# Patient Record
Sex: FEMALE | Race: ASIAN | NOT HISPANIC OR LATINO
[De-identification: names, ages, dates, MRNs, and addresses within clinical notes are randomized per-mention and may not be internally consistent; named-entity substitution may affect disease eponyms.]

---

## 2020-02-07 ENCOUNTER — APPOINTMENT (OUTPATIENT)
Dept: UROLOGY | Facility: CLINIC | Age: 80
End: 2020-02-07
Payer: MEDICAID

## 2020-02-07 VITALS
DIASTOLIC BLOOD PRESSURE: 71 MMHG | HEART RATE: 76 BPM | HEIGHT: 60 IN | WEIGHT: 115 LBS | BODY MASS INDEX: 22.58 KG/M2 | RESPIRATION RATE: 18 BRPM | TEMPERATURE: 97.8 F | SYSTOLIC BLOOD PRESSURE: 138 MMHG | OXYGEN SATURATION: 97 %

## 2020-02-07 DIAGNOSIS — Z78.9 OTHER SPECIFIED HEALTH STATUS: ICD-10-CM

## 2020-02-07 DIAGNOSIS — Z00.00 ENCOUNTER FOR GENERAL ADULT MEDICAL EXAMINATION W/OUT ABNORMAL FINDINGS: ICD-10-CM

## 2020-02-07 DIAGNOSIS — R35.0 FREQUENCY OF MICTURITION: ICD-10-CM

## 2020-02-07 PROCEDURE — 99204 OFFICE O/P NEW MOD 45 MIN: CPT

## 2020-02-07 NOTE — LETTER BODY
[FreeTextEntry1] : Jonatan Donahue MD\par [address]\[place] [address]\par [phone]\par \par Dear Dr. Donahue,\par \par Reason for Visit: UTI\par \par This is a 79 year-old woman with UTI. Patient is referred for evaluation of her condition. Her urine culture was positive for E.coli infection. She denies history of UTI. She denies any dysuria.  She is not sexually active. Currently, patient denies any gross hematuria or dysuria or urinary incontinence. The patient denies any aggravating or relieving factors. The patient denies any interference of function. The patient is entirely asymptomatic. All other review of systems are negative. She has no cancer in her family medical history. She has no previous surgical history. Past medical history, family history and social history were inquired and were noncontributory to current condition. The patient does not use tobacco or drink alcohol. Medications and allergies were reviewed. She has no known allergies to medication. \par \par On examination, the patient is a healthy-appearing woman in no acute distress. She is alert and oriented follows commands. She  has normal mood and affect. She is normocephalic. Oral no thrush. Neck is supple. Respirations are unlabored. Abdomen is soft and nontender. Liver is nonpalpable. Bladder is nonpalpable. No CVA tenderness. Neurologically she is grossly intact. No peripheral edema. Skin without gross abnormality.\par \par Assessment: UTI\par \par I counseled the patient. I discussed the various etiologies of her symptoms. I recommend she take trimethoprim for 3 months to prevent recurrent infection. I discussed the potential side effects of the medication. I counseled the patient on its use and side effects. If the patient develops any side effects, the patient will discontinue the medication and contact me. Patient reports that she is asymptomatic. I recommend she repeat urinalysis, chlamydia/GC amplfiication, and urine culture to further evaluate. Risks and alternatives were discussed. I answered the patient questions. The patient will follow-up as directed and will contact me with any questions or concerns.Thank you for the opportunity to participate in the care of this patient. I'll keep you updated on her progress.\par \par Plan: Trimethoprim for 3 months. Urinalysis. Culture. Chlamydia/GC amplification. Follow up as directed.

## 2020-02-07 NOTE — ADDENDUM
[FreeTextEntry1] : Entered by Allan Rodriguez, acting as scribe for Dr. Josué Zarate.\par \par The documentation recorded by the scribe accurately reflects the service I personally performed and the decisions made by me.

## 2020-02-07 NOTE — PHYSICAL EXAM
[General Appearance - Well Nourished] : well nourished [General Appearance - Well Developed] : well developed [Well Groomed] : well groomed [Normal Appearance] : normal appearance [General Appearance - In No Acute Distress] : no acute distress [Edema] : no peripheral edema [Respiration, Rhythm And Depth] : normal respiratory rhythm and effort [Exaggerated Use Of Accessory Muscles For Inspiration] : no accessory muscle use [Abdomen Soft] : soft [Abdomen Tenderness] : non-tender [Costovertebral Angle Tenderness] : no ~M costovertebral angle tenderness [Urinary Bladder Findings] : the bladder was normal on palpation [Normal Station and Gait] : the gait and station were normal for the patient's age [] : no rash [Oriented To Time, Place, And Person] : oriented to person, place, and time [Affect] : the affect was normal [No Focal Deficits] : no focal deficits [Mood] : the mood was normal [Not Anxious] : not anxious [No Palpable Adenopathy] : no palpable adenopathy

## 2020-02-09 LAB
APPEARANCE: CLEAR
BACTERIA UR CULT: NORMAL
BACTERIA: NEGATIVE
BILIRUBIN URINE: NEGATIVE
BLOOD URINE: NEGATIVE
C TRACH RRNA SPEC QL NAA+PROBE: NOT DETECTED
COLOR: YELLOW
GLUCOSE QUALITATIVE U: NEGATIVE
HYALINE CASTS: 2 /LPF
KETONES URINE: NEGATIVE
LEUKOCYTE ESTERASE URINE: NEGATIVE
MICROSCOPIC-UA: NORMAL
N GONORRHOEA RRNA SPEC QL NAA+PROBE: NOT DETECTED
NITRITE URINE: NEGATIVE
PH URINE: 6
PROTEIN URINE: NORMAL
RED BLOOD CELLS URINE: 2 /HPF
SOURCE AMPLIFICATION: NORMAL
SPECIFIC GRAVITY URINE: 1.03
SQUAMOUS EPITHELIAL CELLS: 1 /HPF
UROBILINOGEN URINE: NORMAL
WHITE BLOOD CELLS URINE: 1 /HPF

## 2020-03-10 ENCOUNTER — APPOINTMENT (OUTPATIENT)
Dept: UROLOGY | Facility: CLINIC | Age: 80
End: 2020-03-10

## 2020-06-09 ENCOUNTER — RX RENEWAL (OUTPATIENT)
Age: 80
End: 2020-06-09

## 2020-06-18 ENCOUNTER — APPOINTMENT (OUTPATIENT)
Dept: UROLOGY | Facility: CLINIC | Age: 80
End: 2020-06-18
Payer: MEDICAID

## 2020-06-18 PROCEDURE — 99214 OFFICE O/P EST MOD 30 MIN: CPT | Mod: 95

## 2020-06-18 RX ORDER — TRIMETHOPRIM 100 MG/1
100 TABLET ORAL
Qty: 30 | Refills: 3 | Status: ACTIVE | COMMUNITY
Start: 2020-02-07 | End: 1900-01-01

## 2020-06-18 NOTE — LETTER BODY
[FreeTextEntry1] : Jonatan Donahue MD\par [address]\[place] [address]\par [phone]\par \par Dear Dr. Donahue,\par \par Reason for Visit: UTI\par \par This is a 79 year-old woman with history of UTI. Her previous urine culture was positive for E.coli infection. Patient requested telephone visit. Verbal consent was obtained. Patient was at home and consulted over the phone through a TeleHealth visit. I was in the office in [place]. Since she was last seen, patient reports no issues. She denies any dysuria or other symptoms of UTI. She continues to take Trimethoprim regularly without side effects or difficulties.  All other review of systems are negative. Past medical history, family history and social history were unchanged. Medications and allergies were reviewed. She has no known allergies to medication. \par \par Given that this was a Telehealth visit, I was unable to physically examine the patient, his physical examination was deferred. \par \par Her urinalysis was unremarkable. Her culture and chlamydia/GC amplification were negative.\par \par Assessment: UTI, resolved.\par \par I counseled the patient. She is overall doing well. I asked that she continue her course of Trimethoprim as directed. I encouraged her to follow-up in 3 months to repeat urinalysis and culture to ensure stability. Risks and alternatives were discussed. I answered the patient questions. Patient understands that if she develops gross hematuria or any urinary discomfort, she will contact me for further evaluation. The patient will follow-up as directed and will contact me with any questions or concerns.Thank you for the opportunity to participate in the care of this patient. I'll keep you updated on her progress.\par \par Plan: Continue course of Trimethoprim. Follow up in 3 months.\par

## 2020-06-18 NOTE — HISTORY OF PRESENT ILLNESS
[Home] : at home, [unfilled] , at the time of the visit. [place] [place] : at the medical office located in  [Verbal consent obtained from patient] : the patient, [unfilled] [FreeTextEntry1] : PT's DAUGHTER will assist with video call. \par CONFIRMED phone#: [phone]\par \par The patient-doctor relationship has been established in a face to face fashion via real time video/audio HIPAA compliant communication using telemedicine software [employer].  The patient's identity has been confirmed.  The patient was previously emailed a copy of the telemedicine consent.  They have had a chance to review and has now given verbal consent and has requested care to be assessed and treated through telemedicine and understands there maybe limitations in this process and they may need further follow up care in the office and or hospital settings.   \par \par Please refer to URO Consult note

## 2023-01-11 ENCOUNTER — INPATIENT (INPATIENT)
Facility: HOSPITAL | Age: 83
LOS: 4 days | Discharge: HOME HEALTH | End: 2023-01-16
Attending: HOSPITALIST | Admitting: HOSPITALIST
Payer: MEDICARE

## 2023-01-11 VITALS
TEMPERATURE: 98 F | SYSTOLIC BLOOD PRESSURE: 135 MMHG | WEIGHT: 134.92 LBS | HEART RATE: 71 BPM | OXYGEN SATURATION: 97 % | RESPIRATION RATE: 18 BRPM | DIASTOLIC BLOOD PRESSURE: 61 MMHG

## 2023-01-11 DIAGNOSIS — Z90.10 ACQUIRED ABSENCE OF UNSPECIFIED BREAST AND NIPPLE: Chronic | ICD-10-CM

## 2023-01-11 LAB
ALBUMIN SERPL ELPH-MCNC: 3.3 G/DL — LOW (ref 3.5–5.2)
ALP SERPL-CCNC: 472 U/L — HIGH (ref 30–115)
ALT FLD-CCNC: 51 U/L — HIGH (ref 0–41)
AMMONIA BLD-MCNC: 27 UMOL/L — SIGNIFICANT CHANGE UP (ref 11–55)
ANION GAP SERPL CALC-SCNC: 11 MMOL/L — SIGNIFICANT CHANGE UP (ref 7–14)
APTT BLD: 32.7 SEC — SIGNIFICANT CHANGE UP (ref 27–39.2)
AST SERPL-CCNC: 100 U/L — HIGH (ref 0–41)
BASOPHILS # BLD AUTO: 0.1 K/UL — SIGNIFICANT CHANGE UP (ref 0–0.2)
BASOPHILS NFR BLD AUTO: 1.3 % — HIGH (ref 0–1)
BILIRUB SERPL-MCNC: 1.1 MG/DL — SIGNIFICANT CHANGE UP (ref 0.2–1.2)
BUN SERPL-MCNC: 42 MG/DL — HIGH (ref 10–20)
CALCIUM SERPL-MCNC: 8.7 MG/DL — SIGNIFICANT CHANGE UP (ref 8.4–10.5)
CHLORIDE SERPL-SCNC: 102 MMOL/L — SIGNIFICANT CHANGE UP (ref 98–110)
CO2 SERPL-SCNC: 23 MMOL/L — SIGNIFICANT CHANGE UP (ref 17–32)
CREAT SERPL-MCNC: 1 MG/DL — SIGNIFICANT CHANGE UP (ref 0.7–1.5)
EGFR: 56 ML/MIN/1.73M2 — LOW
EOSINOPHIL # BLD AUTO: 0.16 K/UL — SIGNIFICANT CHANGE UP (ref 0–0.7)
EOSINOPHIL NFR BLD AUTO: 2 % — SIGNIFICANT CHANGE UP (ref 0–8)
GLUCOSE SERPL-MCNC: 129 MG/DL — HIGH (ref 70–99)
HCT VFR BLD CALC: 28.4 % — LOW (ref 37–47)
HGB BLD-MCNC: 9.4 G/DL — LOW (ref 12–16)
IMM GRANULOCYTES NFR BLD AUTO: 1.1 % — HIGH (ref 0.1–0.3)
INR BLD: 1.07 RATIO — SIGNIFICANT CHANGE UP (ref 0.65–1.3)
LIDOCAIN IGE QN: 48 U/L — SIGNIFICANT CHANGE UP (ref 7–60)
LYMPHOCYTES # BLD AUTO: 1.28 K/UL — SIGNIFICANT CHANGE UP (ref 1.2–3.4)
LYMPHOCYTES # BLD AUTO: 16.1 % — LOW (ref 20.5–51.1)
MCHC RBC-ENTMCNC: 32.1 PG — HIGH (ref 27–31)
MCHC RBC-ENTMCNC: 33.1 G/DL — SIGNIFICANT CHANGE UP (ref 32–37)
MCV RBC AUTO: 96.9 FL — SIGNIFICANT CHANGE UP (ref 81–99)
MONOCYTES # BLD AUTO: 0.51 K/UL — SIGNIFICANT CHANGE UP (ref 0.1–0.6)
MONOCYTES NFR BLD AUTO: 6.4 % — SIGNIFICANT CHANGE UP (ref 1.7–9.3)
NEUTROPHILS # BLD AUTO: 5.8 K/UL — SIGNIFICANT CHANGE UP (ref 1.4–6.5)
NEUTROPHILS NFR BLD AUTO: 73.1 % — SIGNIFICANT CHANGE UP (ref 42.2–75.2)
NRBC # BLD: 0 /100 WBCS — SIGNIFICANT CHANGE UP (ref 0–0)
NT-PROBNP SERPL-SCNC: 428 PG/ML — HIGH (ref 0–300)
PLATELET # BLD AUTO: 233 K/UL — SIGNIFICANT CHANGE UP (ref 130–400)
POTASSIUM SERPL-MCNC: 4.5 MMOL/L — SIGNIFICANT CHANGE UP (ref 3.5–5)
POTASSIUM SERPL-SCNC: 4.5 MMOL/L — SIGNIFICANT CHANGE UP (ref 3.5–5)
PROT SERPL-MCNC: 5.4 G/DL — LOW (ref 6–8)
PROTHROM AB SERPL-ACNC: 12.2 SEC — SIGNIFICANT CHANGE UP (ref 9.95–12.87)
RBC # BLD: 2.93 M/UL — LOW (ref 4.2–5.4)
RBC # FLD: 15 % — HIGH (ref 11.5–14.5)
SARS-COV-2 RNA SPEC QL NAA+PROBE: SIGNIFICANT CHANGE UP
SODIUM SERPL-SCNC: 136 MMOL/L — SIGNIFICANT CHANGE UP (ref 135–146)
TROPONIN T SERPL-MCNC: <0.01 NG/ML — SIGNIFICANT CHANGE UP
WBC # BLD: 7.94 K/UL — SIGNIFICANT CHANGE UP (ref 4.8–10.8)
WBC # FLD AUTO: 7.94 K/UL — SIGNIFICANT CHANGE UP (ref 4.8–10.8)

## 2023-01-11 PROCEDURE — 74177 CT ABD & PELVIS W/CONTRAST: CPT | Mod: 26,MA

## 2023-01-11 PROCEDURE — 70450 CT HEAD/BRAIN W/O DYE: CPT | Mod: 26

## 2023-01-11 PROCEDURE — 99222 1ST HOSP IP/OBS MODERATE 55: CPT

## 2023-01-11 PROCEDURE — 99291 CRITICAL CARE FIRST HOUR: CPT

## 2023-01-11 PROCEDURE — 71275 CT ANGIOGRAPHY CHEST: CPT | Mod: 26

## 2023-01-11 RX ORDER — MEMANTINE HYDROCHLORIDE 10 MG/1
10 TABLET ORAL
Refills: 0 | Status: DISCONTINUED | OUTPATIENT
Start: 2023-01-11 | End: 2023-01-16

## 2023-01-11 RX ORDER — ATORVASTATIN CALCIUM 80 MG/1
10 TABLET, FILM COATED ORAL AT BEDTIME
Refills: 0 | Status: DISCONTINUED | OUTPATIENT
Start: 2023-01-11 | End: 2023-01-16

## 2023-01-11 RX ORDER — ENOXAPARIN SODIUM 100 MG/ML
70 INJECTION SUBCUTANEOUS EVERY 12 HOURS
Refills: 0 | Status: DISCONTINUED | OUTPATIENT
Start: 2023-01-11 | End: 2023-01-12

## 2023-01-11 RX ORDER — ONDANSETRON 8 MG/1
4 TABLET, FILM COATED ORAL EVERY 8 HOURS
Refills: 0 | Status: DISCONTINUED | OUTPATIENT
Start: 2023-01-11 | End: 2023-01-16

## 2023-01-11 RX ORDER — PANTOPRAZOLE SODIUM 20 MG/1
40 TABLET, DELAYED RELEASE ORAL
Refills: 0 | Status: DISCONTINUED | OUTPATIENT
Start: 2023-01-11 | End: 2023-01-16

## 2023-01-11 RX ORDER — OMEGA-3 ACID ETHYL ESTERS 1 G
2 CAPSULE ORAL
Refills: 0 | Status: DISCONTINUED | OUTPATIENT
Start: 2023-01-11 | End: 2023-01-16

## 2023-01-11 RX ORDER — MORPHINE SULFATE 50 MG/1
4 CAPSULE, EXTENDED RELEASE ORAL EVERY 4 HOURS
Refills: 0 | Status: DISCONTINUED | OUTPATIENT
Start: 2023-01-11 | End: 2023-01-16

## 2023-01-11 RX ORDER — MORPHINE SULFATE 50 MG/1
2 CAPSULE, EXTENDED RELEASE ORAL ONCE
Refills: 0 | Status: DISCONTINUED | OUTPATIENT
Start: 2023-01-11 | End: 2023-01-11

## 2023-01-11 RX ORDER — LANOLIN ALCOHOL/MO/W.PET/CERES
3 CREAM (GRAM) TOPICAL AT BEDTIME
Refills: 0 | Status: DISCONTINUED | OUTPATIENT
Start: 2023-01-11 | End: 2023-01-16

## 2023-01-11 RX ORDER — ACETAMINOPHEN 500 MG
650 TABLET ORAL EVERY 6 HOURS
Refills: 0 | Status: DISCONTINUED | OUTPATIENT
Start: 2023-01-11 | End: 2023-01-16

## 2023-01-11 RX ORDER — NALOXONE HYDROCHLORIDE 4 MG/.1ML
0.4 SPRAY NASAL ONCE
Refills: 0 | Status: DISCONTINUED | OUTPATIENT
Start: 2023-01-11 | End: 2023-01-16

## 2023-01-11 RX ORDER — LOSARTAN POTASSIUM 100 MG/1
50 TABLET, FILM COATED ORAL DAILY
Refills: 0 | Status: DISCONTINUED | OUTPATIENT
Start: 2023-01-11 | End: 2023-01-16

## 2023-01-11 RX ORDER — SENNA PLUS 8.6 MG/1
2 TABLET ORAL AT BEDTIME
Refills: 0 | Status: DISCONTINUED | OUTPATIENT
Start: 2023-01-11 | End: 2023-01-16

## 2023-01-11 RX ORDER — CHLORHEXIDINE GLUCONATE 213 G/1000ML
1 SOLUTION TOPICAL
Refills: 0 | Status: DISCONTINUED | OUTPATIENT
Start: 2023-01-11 | End: 2023-01-16

## 2023-01-11 RX ORDER — FUROSEMIDE 40 MG
20 TABLET ORAL EVERY 12 HOURS
Refills: 0 | Status: DISCONTINUED | OUTPATIENT
Start: 2023-01-11 | End: 2023-01-16

## 2023-01-11 RX ORDER — POLYETHYLENE GLYCOL 3350 17 G/17G
17 POWDER, FOR SOLUTION ORAL DAILY
Refills: 0 | Status: DISCONTINUED | OUTPATIENT
Start: 2023-01-11 | End: 2023-01-16

## 2023-01-11 RX ORDER — MORPHINE SULFATE 50 MG/1
2 CAPSULE, EXTENDED RELEASE ORAL EVERY 4 HOURS
Refills: 0 | Status: DISCONTINUED | OUTPATIENT
Start: 2023-01-11 | End: 2023-01-16

## 2023-01-11 RX ORDER — MECLIZINE HCL 12.5 MG
25 TABLET ORAL DAILY
Refills: 0 | Status: DISCONTINUED | OUTPATIENT
Start: 2023-01-11 | End: 2023-01-16

## 2023-01-11 RX ADMIN — MORPHINE SULFATE 2 MILLIGRAM(S): 50 CAPSULE, EXTENDED RELEASE ORAL at 17:37

## 2023-01-11 RX ADMIN — ATORVASTATIN CALCIUM 10 MILLIGRAM(S): 80 TABLET, FILM COATED ORAL at 22:13

## 2023-01-11 RX ADMIN — SENNA PLUS 2 TABLET(S): 8.6 TABLET ORAL at 22:14

## 2023-01-11 RX ADMIN — ENOXAPARIN SODIUM 70 MILLIGRAM(S): 100 INJECTION SUBCUTANEOUS at 22:13

## 2023-01-11 NOTE — ED PROVIDER NOTE - CARE PLAN
1 Principal Discharge DX:	Gallbladder neoplasm  Secondary Diagnosis:	Liver metastasis  Secondary Diagnosis:	Pulmonary embolism  Secondary Diagnosis:	Admission for palliative care

## 2023-01-11 NOTE — H&P ADULT - NSHPLABSRESULTS_GEN_ALL_CORE
9.4    7.94  )-----------( 233      ( 11 Jan 2023 15:36 )             28.4       01-11    136  |  102  |  42<H>  ----------------------------<  129<H>  4.5   |  23  |  1.0    Ca    8.7      11 Jan 2023 15:36    TPro  5.4<L>  /  Alb  3.3<L>  /  TBili  1.1  /  DBili  x   /  AST  100<H>  /  ALT  51<H>  /  AlkPhos  472<H>  01-11                      PT/INR - ( 11 Jan 2023 15:36 )   PT: 12.20 sec;   INR: 1.07 ratio         PTT - ( 11 Jan 2023 15:36 )  PTT:32.7 sec    Lactate Trend      CARDIAC MARKERS ( 11 Jan 2023 17:41 )  x     / <0.01 ng/mL / x     / x     / x          CT Abdomen and Pelvis w/ IV Cont (01.11.23 @ 17:07)   IMPRESSION: Gallbladder with diffuse asymmetric wall thickening up to 1.4 cm, consistent with given history of gallbladder carcinoma.  Diffuse hepatic metastases, retroperitoneal and periportal lymphadenopathy.  Small pelvic ascites.  Small left pleural effusion.  Acute pulmonary emboli in the right lower lobar pulmonary arteries, partially imaged.

## 2023-01-11 NOTE — ED PROVIDER NOTE - PHYSICAL EXAMINATION
CONST: chronically ill appearing.   EYES: , Sclera and conjunctiva clear.  NECK: Non-tender, no meningeal signs, supple  CARD: Normal S1 S2; Normal rate and rhythm  RESP: crackles at bases, no distress  GI: Soft, non-tender, non-distended.  MS: Normal ROM in all extremities. 4+ edema of lower extremities extending up to thighs. no calf pain, pedal pulses 1+ bilaterally  SKIN: Warm, dry, no acute rashes. Good turgor  NEURO: A&Ox3, No focal deficits. Strength 5/5 with no sensory deficits

## 2023-01-11 NOTE — H&P ADULT - ASSESSMENT
Patient is a 82 year old Female with past medical history of GB cancer with Liver mets, breast cancer s/p mastectomy, HTN, HLD, was brought into the ER by child stating her leg swelling is not improving with outpatient Lasix and uncontrollable abdominal pain. Son is requesting hospice / palliative care     # GB cancer with Liver mets   # Breast cancer s/p mastectomy   # Abdominal pain  - Hospice consult   - Palliative consult   - Analgesia PRN   - MOLST Form filled     # LE Edema with Failed outpatient Lasix   - PO Lasix 20 BID  -   - Daily weights     # Essential Hypertension    - Monitor Vital Signs    - DASH diet   - Continue with home medications     # Mixed Hyperlipidemia  - DASH diet   - Continue with home medications     # Miscellaneous   - Continue with home medications   - Monitor Vital Signs   - Activity Order (Ambulate with Assistance)   - Diet order   - DVT PPX: HOLD OFF UNTIL IMAGING IS DONE: IF SHE HAS A PE WILL REQUIRE A HEPARIN DRIP  - GI PPX (Protonix)  - CHG 4% Bath QD and PRN  - Discussed the above case and plan with the Attending      Patient is a 82 year old Female with past medical history of GB cancer with Liver mets, breast cancer s/p mastectomy, HTN, HLD, was brought into the ER by child stating her leg swelling is not improving with outpatient Lasix and uncontrollable abdominal pain. Son is requesting hospice / palliative care     # GB cancer with Liver mets   # Breast cancer s/p mastectomy   # Abdominal pain  # PE in right lung  - Hospice consult   - Palliative consult   - family seeking Oncology eval for second opinion (possible palliative treatment vs hospice care)  - Analgesia PRN   - MOLST Form filled   - if CT head shows no mets, start lovenox treatment dose    # LE Edema with Failed outpatient Lasix   - PO Lasix 20 BID  -   - Daily weights     # Essential Hypertension    - Monitor Vital Signs    - DASH diet   - Continue with home medications     # Mixed Hyperlipidemia  - DASH diet   - Continue with home medications     # Miscellaneous   - Continue with home medications   - Monitor Vital Signs   - Activity Order (Ambulate with Assistance)   - Diet order   - DVT PPX: pending CT head, likely needs AC  - GI PPX (Protonix)  - CHG 4% Bath QD and PRN  - Discussed the above case and plan with the Attending      Patient is a 82 year old Female with past medical history of GB cancer with Liver mets, breast cancer s/p mastectomy, HTN, HLD, was brought into the ER by child stating her leg swelling is not improving with outpatient Lasix and uncontrollable abdominal pain.     # GB cancer with Liver mets   # Breast cancer s/p mastectomy   # Abdominal pain  # PE in right lung  - Hospice consult   - Palliative consult   - family seeking Oncology eval for second opinion (possible palliative treatment vs hospice care)  - Analgesia PRN   - MOLST Form filled   - if CT head shows no mets, start lovenox treatment dose    # LE Edema with Failed outpatient Lasix   - PO Lasix 20 BID  -   - Daily weights     # Essential Hypertension    - Monitor Vital Signs    - DASH diet   - Continue with home medications     # Mixed Hyperlipidemia  - DASH diet   - Continue with home medications     # Miscellaneous   - Continue with home medications   - Monitor Vital Signs   - Activity Order (Ambulate with Assistance)   - Diet order   - DVT PPX: pending CT head, likely needs AC  - GI PPX (Protonix)  - CHG 4% Bath QD and PRN  - Discussed the above case and plan with the Attending

## 2023-01-11 NOTE — ED PROVIDER NOTE - OBJECTIVE STATEMENT
82-year-old female with history of gallbladder cancer and liver presented to the emergency department for evaluation of lower extremity edema and abdominal pain lower extremity edema present times months, worse despite furosemide treatment outpatient.  Also and has uncontrolled abdominal pain.  Scheduled to go for endoscopy tomorrow for further evaluation.  CT on 1/5/2023 shows innumerable hepatic masses consistent assistant with hepatic metastasis most of which are necrotic.  Gallbladder is diffusely irregular soft tissue nodularity of gallbladder wall in the fundus concerning for gallbladder neoplasm.  Patient has diffuse periportal, aortocaval, retroperitoneal/left para-aortic lymph lymphadenopathy consistent with mets.  Daughter concerned given edema not responding to outpatient treatment and intractable abdominal pain. Patient has not undergone any treatment for the above findings. No known fevers or chills.  Denies any other complaints

## 2023-01-11 NOTE — ED PROVIDER NOTE - ATTENDING CONTRIBUTION TO CARE
I was present for and supervised the key and critical aspects of the procedures performed during the care of the patient. Patient is an 82-year-old female with history of gallbladder cancer as well as liver metastasis presents to the emergency department for worsening lower extremity edema as well as abdominal pain onset over the past several months worse since patient initiated diuretic treatment as an outpatient patient describes pain is constant gnawing in nature she denies any fevers chills notes nausea however no vomiting no diarrhea no skin changes patient's cysts primarily managed at a facility in [place] however has a paper report CT on 1 5 reveals multiple hepatic masses with necrosis confirming the above I reviewed inpatient records patient not undergoing chemotherapy or treatment for malignancy she denies any chest pain shortness of breath diaphoresis or other skin changes    On physical exam patient is ill-appearing normocephalic atraumatic pupils equal round reactive light accommodation extraocular muscle intact oropharynx clear chest reveals crackles b/l abdomen diffuse tenderness to palpation however no guarding no rebound bowel sounds positive extremities patient has 4+ pitting edema past the knees pedal pulses 2+ and equal skin no rashes neurologically patient has full strength full sensation able to move all 4 extremities    Assessment plan patient is brought in by her daughter for evaluation of worsening lower extremity edema however patient is known to have gallbladder mass with liver metastasis patient given IV fluids IV pain medicine per my independent evaluation EKG not consistent with STEMI given the patient's symptoms we obtained CT abdomen pelvis which confirms the above findings in addition patient had a PE found on the CT of the abdomen pelvis we obtained a dedicated CT of the chest which reveals PE given that this patient has metastasis we obtained CT of the head prior to starting anticoagulation that was negative for metastasis started on anticoagulation daughter was updated and agrees with plan of care we discussed the case with Dr. Anderson the admitting physician in preparation consulted gastroenterology given this nature of the malignancy patient's worsening edema and worsening weakness I will admit for further monitoring at this time

## 2023-01-11 NOTE — H&P ADULT - HISTORY OF PRESENT ILLNESS
Patient is a 82 year old Female with past medical history of GB cancer with Liver mets, breast cancer s/p mastectomy, HTN, HLD, was brought into the ER by child stating her leg swelling is not improving with outpatient Lasix and uncontrollable abdominal pain. Morphine has improved the pain. Patient was scheduled to have an EGD with EUS tomorrow (1/12/2023). Son denies any fevers, chills, nausea, vomiting, chest pain, SOB, diarrhea, constipation, jaundice.

## 2023-01-11 NOTE — ED PROVIDER NOTE - NS ED ROS FT
Constitutional: See HPI.  Eyes: No visual changes, eye pain or discharge.  ENMT: No hearing changes, pain, discharge or infections. No neck pain or stiffness. No limited ROM  Cardiac: + lower extremity edema. No chest pain with exertion.  Respiratory: No cough or respiratory distress.   GI: + abd pain.   : No dysuria, frequency or burning. No Discharge  MS: No myalgia, muscle weakness, joint pain or back pain.  Neuro: No headache or weakness.  Skin: No skin rash.  Except as documented in the HPI, all other systems are negative.

## 2023-01-12 DIAGNOSIS — C23 MALIGNANT NEOPLASM OF GALLBLADDER: ICD-10-CM

## 2023-01-12 DIAGNOSIS — Z51.5 ENCOUNTER FOR PALLIATIVE CARE: ICD-10-CM

## 2023-01-12 DIAGNOSIS — R10.9 UNSPECIFIED ABDOMINAL PAIN: ICD-10-CM

## 2023-01-12 DIAGNOSIS — Z71.89 OTHER SPECIFIED COUNSELING: ICD-10-CM

## 2023-01-12 LAB
ALBUMIN SERPL ELPH-MCNC: 2.8 G/DL — LOW (ref 3.5–5.2)
ALP SERPL-CCNC: 444 U/L — HIGH (ref 30–115)
ALT FLD-CCNC: 47 U/L — HIGH (ref 0–41)
ANION GAP SERPL CALC-SCNC: 13 MMOL/L — SIGNIFICANT CHANGE UP (ref 7–14)
AST SERPL-CCNC: 97 U/L — HIGH (ref 0–41)
BILIRUB SERPL-MCNC: 1.1 MG/DL — SIGNIFICANT CHANGE UP (ref 0.2–1.2)
BUN SERPL-MCNC: 36 MG/DL — HIGH (ref 10–20)
CALCIUM SERPL-MCNC: 8.5 MG/DL — SIGNIFICANT CHANGE UP (ref 8.4–10.5)
CHLORIDE SERPL-SCNC: 107 MMOL/L — SIGNIFICANT CHANGE UP (ref 98–110)
CO2 SERPL-SCNC: 21 MMOL/L — SIGNIFICANT CHANGE UP (ref 17–32)
CREAT SERPL-MCNC: 1 MG/DL — SIGNIFICANT CHANGE UP (ref 0.7–1.5)
EGFR: 56 ML/MIN/1.73M2 — LOW
GLUCOSE SERPL-MCNC: 99 MG/DL — SIGNIFICANT CHANGE UP (ref 70–99)
HCT VFR BLD CALC: 24.4 % — LOW (ref 37–47)
HGB BLD-MCNC: 8.1 G/DL — LOW (ref 12–16)
MCHC RBC-ENTMCNC: 32.3 PG — HIGH (ref 27–31)
MCHC RBC-ENTMCNC: 33.2 G/DL — SIGNIFICANT CHANGE UP (ref 32–37)
MCV RBC AUTO: 97.2 FL — SIGNIFICANT CHANGE UP (ref 81–99)
NRBC # BLD: 0 /100 WBCS — SIGNIFICANT CHANGE UP (ref 0–0)
PLATELET # BLD AUTO: 207 K/UL — SIGNIFICANT CHANGE UP (ref 130–400)
POTASSIUM SERPL-MCNC: 4.1 MMOL/L — SIGNIFICANT CHANGE UP (ref 3.5–5)
POTASSIUM SERPL-SCNC: 4.1 MMOL/L — SIGNIFICANT CHANGE UP (ref 3.5–5)
PROT SERPL-MCNC: 5 G/DL — LOW (ref 6–8)
RBC # BLD: 2.51 M/UL — LOW (ref 4.2–5.4)
RBC # FLD: 15 % — HIGH (ref 11.5–14.5)
SODIUM SERPL-SCNC: 141 MMOL/L — SIGNIFICANT CHANGE UP (ref 135–146)
WBC # BLD: 6.71 K/UL — SIGNIFICANT CHANGE UP (ref 4.8–10.8)
WBC # FLD AUTO: 6.71 K/UL — SIGNIFICANT CHANGE UP (ref 4.8–10.8)

## 2023-01-12 PROCEDURE — 99232 SBSQ HOSP IP/OBS MODERATE 35: CPT

## 2023-01-12 PROCEDURE — 99223 1ST HOSP IP/OBS HIGH 75: CPT

## 2023-01-12 PROCEDURE — 99222 1ST HOSP IP/OBS MODERATE 55: CPT

## 2023-01-12 PROCEDURE — 99221 1ST HOSP IP/OBS SF/LOW 40: CPT

## 2023-01-12 PROCEDURE — 93970 EXTREMITY STUDY: CPT | Mod: 26

## 2023-01-12 RX ORDER — INFLUENZA VIRUS VACCINE 15; 15; 15; 15 UG/.5ML; UG/.5ML; UG/.5ML; UG/.5ML
0.7 SUSPENSION INTRAMUSCULAR ONCE
Refills: 0 | Status: COMPLETED | OUTPATIENT
Start: 2023-01-12 | End: 2023-01-12

## 2023-01-12 RX ORDER — ENOXAPARIN SODIUM 100 MG/ML
70 INJECTION SUBCUTANEOUS EVERY 12 HOURS
Refills: 0 | Status: DISCONTINUED | OUTPATIENT
Start: 2023-01-12 | End: 2023-01-13

## 2023-01-12 RX ADMIN — ENOXAPARIN SODIUM 70 MILLIGRAM(S): 100 INJECTION SUBCUTANEOUS at 17:38

## 2023-01-12 RX ADMIN — Medication 20 MILLIGRAM(S): at 17:41

## 2023-01-12 RX ADMIN — MEMANTINE HYDROCHLORIDE 10 MILLIGRAM(S): 10 TABLET ORAL at 17:38

## 2023-01-12 RX ADMIN — ENOXAPARIN SODIUM 70 MILLIGRAM(S): 100 INJECTION SUBCUTANEOUS at 05:56

## 2023-01-12 RX ADMIN — Medication 2 GRAM(S): at 05:56

## 2023-01-12 RX ADMIN — ATORVASTATIN CALCIUM 10 MILLIGRAM(S): 80 TABLET, FILM COATED ORAL at 22:37

## 2023-01-12 RX ADMIN — PANTOPRAZOLE SODIUM 40 MILLIGRAM(S): 20 TABLET, DELAYED RELEASE ORAL at 05:56

## 2023-01-12 RX ADMIN — MEMANTINE HYDROCHLORIDE 10 MILLIGRAM(S): 10 TABLET ORAL at 05:55

## 2023-01-12 RX ADMIN — Medication 20 MILLIGRAM(S): at 05:55

## 2023-01-12 RX ADMIN — CHLORHEXIDINE GLUCONATE 1 APPLICATION(S): 213 SOLUTION TOPICAL at 06:05

## 2023-01-12 RX ADMIN — Medication 2 GRAM(S): at 17:47

## 2023-01-12 RX ADMIN — SENNA PLUS 2 TABLET(S): 8.6 TABLET ORAL at 22:38

## 2023-01-12 RX ADMIN — LOSARTAN POTASSIUM 50 MILLIGRAM(S): 100 TABLET, FILM COATED ORAL at 05:55

## 2023-01-12 NOTE — PHYSICAL THERAPY INITIAL EVALUATION ADULT - SPECIFY REASON(S)
Attempted to see the Pt this AM for Bedside PT , Pt refused despite max encouragement , Daughter bedside ,RN aware . will f/u as appropriate.

## 2023-01-12 NOTE — CONSULT NOTE ADULT - SUBJECTIVE AND OBJECTIVE BOX
Patient is a 82y old  Female who presents with a chief complaint of LE edema (12 Jan 2023 16:28)      HPI:  Patient is a 82 year old Female with past medical history of GB cancer with Liver mets, breast cancer s/p mastectomy, HTN, HLD, was brought into the ER by child stating her leg swelling is not improving with outpatient Lasix and uncontrollable abdominal pain. Morphine has improved the pain. Patient was scheduled to have an EGD with EUS tomorrow (1/12/2023). Son denies any fevers, chills, nausea, vomiting, chest pain, SOB, diarrhea, constipation, jaundice.  (11 Jan 2023 18:13)              ROS:  Negative except for:    PAST MEDICAL & SURGICAL HISTORY:  Gallbladder neoplasm      Liver metastases      Hypertension      S/P mastectomy          SOCIAL HISTORY:    FAMILY HISTORY:  No pertinent family history in first degree relatives        MEDICATIONS  (STANDING):  atorvastatin 10 milliGRAM(s) Oral at bedtime  chlorhexidine 4% Liquid 1 Application(s) Topical <User Schedule>  enoxaparin Injectable 70 milliGRAM(s) SubCutaneous every 12 hours  furosemide    Tablet 20 milliGRAM(s) Oral every 12 hours  influenza  Vaccine (HIGH DOSE) 0.7 milliLiter(s) IntraMuscular once  losartan 50 milliGRAM(s) Oral daily  memantine 10 milliGRAM(s) Oral two times a day  naloxone Injectable 0.4 milliGRAM(s) IV Push once  omega-3-Acid Ethyl Esters 2 Gram(s) Oral two times a day  pantoprazole    Tablet 40 milliGRAM(s) Oral before breakfast  polyethylene glycol 3350 17 Gram(s) Oral daily  senna 2 Tablet(s) Oral at bedtime    MEDICATIONS  (PRN):  acetaminophen     Tablet .. 650 milliGRAM(s) Oral every 6 hours PRN Temp greater or equal to 38C (100.4F), Mild Pain (1 - 3)  aluminum hydroxide/magnesium hydroxide/simethicone Suspension 30 milliLiter(s) Oral every 4 hours PRN Dyspepsia  bisacodyl 5 milliGRAM(s) Oral daily PRN Constipation  meclizine 25 milliGRAM(s) Oral daily PRN Dizziness  melatonin 3 milliGRAM(s) Oral at bedtime PRN Insomnia  morphine  - Injectable 2 milliGRAM(s) IV Push every 4 hours PRN Moderate Pain (4 - 6)  morphine  - Injectable 4 milliGRAM(s) IV Push every 4 hours PRN Severe Pain (7 - 10)  ondansetron Injectable 4 milliGRAM(s) IV Push every 8 hours PRN Nausea and/or Vomiting      Allergies    aspirin (Unknown)  penicillin (Unknown)    Intolerances        Vital Signs Last 24 Hrs  T(C): 36 (12 Jan 2023 14:05), Max: 36.7 (12 Jan 2023 05:10)  T(F): 96.8 (12 Jan 2023 14:05), Max: 98.1 (12 Jan 2023 05:10)  HR: 77 (12 Jan 2023 14:05) (77 - 84)  BP: 138/63 (12 Jan 2023 14:05) (138/63 - 142/60)  BP(mean): --  RR: 18 (12 Jan 2023 14:05) (18 - 18)  SpO2: --              PHYSICAL EXAM  General: adult in NAD  HEENT: clear oropharynx, anicteric sclera, pink conjunctiva  Neck: supple  CV: normal S1/S2 with no murmur rubs or gallops  Lungs: positive air movement b/l ant lungs,clear to auscultation, no wheezes, no rales  Abdomen: soft non-tender non-distended, no hepatosplenomegaly  Ext: no clubbing cyanosis or edema  Skin: no rashes and no petechiae  Neuro: alert and oriented X 4, no focal deficits      LABS:                          8.1    6.71  )-----------( 207      ( 12 Jan 2023 06:45 )             24.4         Mean Cell Volume : 97.2 fL  Mean Cell Hemoglobin : 32.3 pg  Mean Cell Hemoglobin Concentration : 33.2 g/dL  Auto Neutrophil # : x  Auto Lymphocyte # : x  Auto Monocyte # : x  Auto Eosinophil # : x  Auto Basophil # : x  Auto Neutrophil % : x  Auto Lymphocyte % : x  Auto Monocyte % : x  Auto Eosinophil % : x  Auto Basophil % : x      Serial CBC's  01-12 @ 06:45  Hct-24.4 / Hgb-8.1 / Plat-207 / RBC-2.51 / WBC-6.71  Serial CBC's  01-11 @ 15:36  Hct-28.4 / Hgb-9.4 / Plat-233 / RBC-2.93 / WBC-7.94      01-12    141  |  107  |  36<H>  ----------------------------<  99  4.1   |  21  |  1.0    Ca    8.5      12 Jan 2023 06:45    TPro  5.0<L>  /  Alb  2.8<L>  /  TBili  1.1  /  DBili  x   /  AST  97<H>  /  ALT  47<H>  /  AlkPhos  444<H>  01-12      PT/INR - ( 11 Jan 2023 15:36 )   PT: 12.20 sec;   INR: 1.07 ratio         PTT - ( 11 Jan 2023 15:36 )  PTT:32.7 sec          RADIOLOGY & ADDITIONAL STUDIES:    < from: CT Abdomen and Pelvis w/ IV Cont (01.11.23 @ 17:07) >  IMPRESSION: Gallbladder with diffuse asymmetric wall thickening up to 1.4   cm, consistent with given history of gallbladder carcinoma.    Diffuse hepatic metastases, retroperitoneal and periportal   lymphadenopathy.    Small pelvic ascites.    Small left pleural effusion.    Acute pulmonary emboli in the right lower lobar pulmonary arteries,   partially imaged.        Spoke with Dr. TAFOYA on 1/11/2023 5:39 PM with readback.    < end of copied text >      < from: CT Angio Chest PE Protocol w/ IV Cont (01.11.23 @ 18:12) >    IMPRESSION:    Pulmonary embolism in the right lower lobe pulmonary artery with   extension into multiple segmental and subsegmental branches.      Findings were discussed with Dr. Tafoya at 6:50 PM 1/11/2023, with read   back    < end of copied text >    < from: CT Head No Cont (01.11.23 @ 18:12) >  Impression:    No acute intracranial abnormality. No CT evidence of metastatic lesion.    --- End of Report ---    < end of copied text >     Patient is a 82y old  Female who presents with a chief complaint of LE edema (12 Jan 2023 16:28)      HPI:  Patient is a 82 year old Female with past medical history of GB cancer with Liver mets, breast cancer s/p mastectomy, HTN, HLD, was brought into the ER by child stating her leg swelling is not improving with outpatient Lasix and uncontrollable abdominal pain. Morphine has improved the pain. Patient was scheduled to have an EGD with EUS tomorrow (1/12/2023). Son denies any fevers, chills, nausea, vomiting, chest pain, SOB, diarrhea, constipation, jaundice.  (11 Jan 2023 18:13)      Oncology    We were asked to see the patient in regards to treatment options.  Her grandson  Hussein was at bedside who translated for me also I was on the phone with the patient's daughter and son-in-law during the visit.  They cannot provide me much about the second opinion but apparently she was recommended to be transition to hospice.    The patient has been relatively ambulatory aids and recently she has not been walking much in the last few days.  She has right upper quadrant and epigastric pain.  She lost weight.  She developed edema in the lower extremities.  She lives with her daughter as well as her  and has been relatively independent.        Prior to me examining her I saw her walk to the bathroom.  She did require help getting out of bed was little bit unsteady on her feet and was looking for things to hold onto.  Also had a somewhat hard time getting into bed some which was due to of her edema      ROS:  Negative except for: as above, edema, pain, weight loss, fatigue, rest of 12 point ROS was negative    PAST MEDICAL & SURGICAL HISTORY:  Gallbladder neoplasm      Liver metastases      Hypertension      S/P mastectomy          SOCIAL HISTORY: Denies alcohol, smoking or IV drugs use     FAMILY HISTORY:  Mom had throat cancer      MEDICATIONS  (STANDING):  atorvastatin 10 milliGRAM(s) Oral at bedtime  chlorhexidine 4% Liquid 1 Application(s) Topical <User Schedule>  enoxaparin Injectable 70 milliGRAM(s) SubCutaneous every 12 hours  furosemide    Tablet 20 milliGRAM(s) Oral every 12 hours  influenza  Vaccine (HIGH DOSE) 0.7 milliLiter(s) IntraMuscular once  losartan 50 milliGRAM(s) Oral daily  memantine 10 milliGRAM(s) Oral two times a day  naloxone Injectable 0.4 milliGRAM(s) IV Push once  omega-3-Acid Ethyl Esters 2 Gram(s) Oral two times a day  pantoprazole    Tablet 40 milliGRAM(s) Oral before breakfast  polyethylene glycol 3350 17 Gram(s) Oral daily  senna 2 Tablet(s) Oral at bedtime    MEDICATIONS  (PRN):  acetaminophen     Tablet .. 650 milliGRAM(s) Oral every 6 hours PRN Temp greater or equal to 38C (100.4F), Mild Pain (1 - 3)  aluminum hydroxide/magnesium hydroxide/simethicone Suspension 30 milliLiter(s) Oral every 4 hours PRN Dyspepsia  bisacodyl 5 milliGRAM(s) Oral daily PRN Constipation  meclizine 25 milliGRAM(s) Oral daily PRN Dizziness  melatonin 3 milliGRAM(s) Oral at bedtime PRN Insomnia  morphine  - Injectable 2 milliGRAM(s) IV Push every 4 hours PRN Moderate Pain (4 - 6)  morphine  - Injectable 4 milliGRAM(s) IV Push every 4 hours PRN Severe Pain (7 - 10)  ondansetron Injectable 4 milliGRAM(s) IV Push every 8 hours PRN Nausea and/or Vomiting      Allergies    aspirin (Unknown)  penicillin (Unknown)    Intolerances        Vital Signs Last 24 Hrs  T(C): 36 (12 Jan 2023 14:05), Max: 36.7 (12 Jan 2023 05:10)  T(F): 96.8 (12 Jan 2023 14:05), Max: 98.1 (12 Jan 2023 05:10)  HR: 77 (12 Jan 2023 14:05) (77 - 84)  BP: 138/63 (12 Jan 2023 14:05) (138/63 - 142/60)  BP(mean): --  RR: 18 (12 Jan 2023 14:05) (18 - 18)  SpO2: --              PHYSICAL EXAM  General: adult in NAD, looked tired  HEENT: clear oropharynx, anicteric sclera, pink conjunctiva  Neck: supple  CV: normal S1/S2 with no murmur rubs or gallops  Lungs: positive air movement b/l ant lungs, clear to auscultation, no wheezes, no rales  Abdomen: tenderness in RUQ on exam but soft  Ext: no clubbing cyanosis but has edema   Skin: no rashes and no petechiae  Neuro: alert and oriented X 4, no focal deficits      LABS:                          8.1    6.71  )-----------( 207      ( 12 Jan 2023 06:45 )             24.4         Mean Cell Volume : 97.2 fL  Mean Cell Hemoglobin : 32.3 pg  Mean Cell Hemoglobin Concentration : 33.2 g/dL  Auto Neutrophil # : x  Auto Lymphocyte # : x  Auto Monocyte # : x  Auto Eosinophil # : x  Auto Basophil # : x  Auto Neutrophil % : x  Auto Lymphocyte % : x  Auto Monocyte % : x  Auto Eosinophil % : x  Auto Basophil % : x      Serial CBC's  01-12 @ 06:45  Hct-24.4 / Hgb-8.1 / Plat-207 / RBC-2.51 / WBC-6.71  Serial CBC's  01-11 @ 15:36  Hct-28.4 / Hgb-9.4 / Plat-233 / RBC-2.93 / WBC-7.94      01-12    141  |  107  |  36<H>  ----------------------------<  99  4.1   |  21  |  1.0    Ca    8.5      12 Jan 2023 06:45    TPro  5.0<L>  /  Alb  2.8<L>  /  TBili  1.1  /  DBili  x   /  AST  97<H>  /  ALT  47<H>  /  AlkPhos  444<H>  01-12      PT/INR - ( 11 Jan 2023 15:36 )   PT: 12.20 sec;   INR: 1.07 ratio         PTT - ( 11 Jan 2023 15:36 )  PTT:32.7 sec          RADIOLOGY & ADDITIONAL STUDIES:    < from: CT Abdomen and Pelvis w/ IV Cont (01.11.23 @ 17:07) >  IMPRESSION: Gallbladder with diffuse asymmetric wall thickening up to 1.4   cm, consistent with given history of gallbladder carcinoma.    Diffuse hepatic metastases, retroperitoneal and periportal   lymphadenopathy.    Small pelvic ascites.    Small left pleural effusion.    Acute pulmonary emboli in the right lower lobar pulmonary arteries,   partially imaged.        Spoke with Dr. TAFOYA on 1/11/2023 5:39 PM with readback.    < end of copied text >      < from: CT Angio Chest PE Protocol w/ IV Cont (01.11.23 @ 18:12) >    IMPRESSION:    Pulmonary embolism in the right lower lobe pulmonary artery with   extension into multiple segmental and subsegmental branches.      Findings were discussed with Dr. Tafoya at 6:50 PM 1/11/2023, with read   back    < end of copied text >    < from: CT Head No Cont (01.11.23 @ 18:12) >  Impression:    No acute intracranial abnormality. No CT evidence of metastatic lesion.    --- End of Report ---    < end of copied text >

## 2023-01-12 NOTE — PATIENT PROFILE ADULT - FALL HARM RISK - UNIVERSAL INTERVENTIONS
Detail Level: Simple
Bed in lowest position, wheels locked, appropriate side rails in place/Call bell, personal items and telephone in reach/Instruct patient to call for assistance before getting out of bed or chair/Non-slip footwear when patient is out of bed/[place] to call system/Physically safe environment - no spills, clutter or unnecessary equipment/Purposeful Proactive Rounding/Room/bathroom lighting operational, light cord in reach

## 2023-01-12 NOTE — CONSULT NOTE ADULT - ASSESSMENT
82F with PMH of gallbladder cancer with liver mets, breast cancer s/p mastectomy, HTN, and HLD here with LE edema and abdominal pain. CT abdomen showed diffuse hepatic mets; patient was initially scheduled for EGD 1/12/23 as outpatient. Outpatient oncology reportedly recommended hospice. Family is considering second opinion from oncology for palliative disease-directed therapy, vs hospice care. Is on lovenox for PE. Is on morphine 2mg IV and 4mg IV PRN, used 2mg IV x 1/24 hours. Palliative care called for symptoms and GOC.

## 2023-01-12 NOTE — HOSPICE CARE NOTE - CONVESATION DETAILS
Attempts to reach patient's daughter and son in law unsuccessful. Unable to leave voice mail on daughter's line as mailbox was full. left message with hospice contact information on son in law's voice mail. Pending return call.  Patient's spouse also admitted to [facility] with hospice referral orders.

## 2023-01-12 NOTE — PATIENT PROFILE ADULT - FUNCTIONAL ASSESSMENT - DAILY ACTIVITY ASSESSMENT TYPE
Pt arrives c/o SOB at rest starting at midnight 1/2.  Pt states he felt a little better and went to bed and woke up and \"couldn't breathe.\"  Pt states he has history of pneumonia and is worried that is what is happening.  Pt denies cough, fever, chills, pain, dizziness.  Pt states he only takes his \"water pill when I need to because it makes me pee so much.\"  No other complaints at this time.   
Admission

## 2023-01-12 NOTE — CONSULT NOTE ADULT - PROBLEM SELECTOR RECOMMENDATION 9
Full consult to follow shortly. Please call [phone] with any acute concerns. - patient currently comfortable  - has tylenol PRN for pain, as well as morphine 4mg IV Q4 PRN, would continue for now (none used/24 hours)

## 2023-01-12 NOTE — CONSULT NOTE ADULT - SUBJECTIVE AND OBJECTIVE BOX
Chief complaint/Reason for consult: gallbladder neoplasm wit liver mets    HPI:  Patient is a 82 year old Female with past medical history of GB cancer with Liver mets, breast cancer s/p mastectomy, HTN, HLD, was brought into the ER by child stating her leg swelling is not improving with outpatient Lasix and uncontrollable abdominal pain. Morphine has improved the pain. Patient was scheduled to have an EGD with EUS tomorrow (1/12/2023). Son denies any fevers, chills, nausea, vomiting, chest pain, SOB, diarrhea, constipation, jaundice.  (11 Jan 2023 18:13)     ID#[id number]  GI Updates: 82yFemale [facility] GB cancer with liver mets, breast cancer s/p mastectomy, HTN admitted with anasarca and abdominal pain. Currently Patient denies nausea, vomiting, hematemesis, melena, blood in stool, diarrhea, constipation, abdominal pain.      PAST MEDICAL & SURGICAL HISTORY:   Gallbladder neoplasm      Liver metastases      Hypertension      S/P mastectomy            Family history:  FAMILY HISTORY:  No pertinent family history in first degree relatives      No GI cancers in first or second degree relatives    Social History: No smoking. No alcohol. No illegal drug use.    Allergies:   aspirin (Unknown)  penicillin (Unknown)      MEDICATIONS: Home Medications:  atorvastatin 10 mg oral tablet: 1 tab(s) orally once a day (at bedtime) (11 Jan 2023 18:21)  furosemide 20 mg oral tablet: 1 tab(s) orally once a day (11 Jan 2023 18:21)  losartan 50 mg oral tablet: 1 tab(s) orally once a day (11 Jan 2023 18:21)  meclizine 25 mg oral tablet: 1 tab(s) orally once a day, As Needed (11 Jan 2023 18:21)  memantine 10 mg oral tablet: 1 tab(s) orally 2 times a day (11 Jan 2023 18:21)  Vascepa 1 g oral capsule: 2 cap(s) orally 2 times a day (11 Jan 2023 18:21)    MEDICATIONS  (STANDING):  atorvastatin 10 milliGRAM(s) Oral at bedtime  chlorhexidine 4% Liquid 1 Application(s) Topical <User Schedule>  enoxaparin Injectable 70 milliGRAM(s) SubCutaneous every 12 hours  furosemide    Tablet 20 milliGRAM(s) Oral every 12 hours  influenza  Vaccine (HIGH DOSE) 0.7 milliLiter(s) IntraMuscular once  losartan 50 milliGRAM(s) Oral daily  memantine 10 milliGRAM(s) Oral two times a day  naloxone Injectable 0.4 milliGRAM(s) IV Push once  omega-3-Acid Ethyl Esters 2 Gram(s) Oral two times a day  pantoprazole    Tablet 40 milliGRAM(s) Oral before breakfast  polyethylene glycol 3350 17 Gram(s) Oral daily  senna 2 Tablet(s) Oral at bedtime    MEDICATIONS  (PRN):  acetaminophen     Tablet .. 650 milliGRAM(s) Oral every 6 hours PRN Temp greater or equal to 38C (100.4F), Mild Pain (1 - 3)  aluminum hydroxide/magnesium hydroxide/simethicone Suspension 30 milliLiter(s) Oral every 4 hours PRN Dyspepsia  bisacodyl 5 milliGRAM(s) Oral daily PRN Constipation  meclizine 25 milliGRAM(s) Oral daily PRN Dizziness  melatonin 3 milliGRAM(s) Oral at bedtime PRN Insomnia  morphine  - Injectable 2 milliGRAM(s) IV Push every 4 hours PRN Moderate Pain (4 - 6)  morphine  - Injectable 4 milliGRAM(s) IV Push every 4 hours PRN Severe Pain (7 - 10)  ondansetron Injectable 4 milliGRAM(s) IV Push every 8 hours PRN Nausea and/or Vomiting        REVIEW OF SYSTEMS  General:  No , fevers, or chills.  Eyes:  No reported pain or visual changes  ENT:  No sore throat or runny nose.  NECK: No stiffness or lymphadenopathy  CV:  No chest pain or palpitations.  Resp:  No shortness of breath, cough, wheezing or hemoptysis  GI:  No abdominal pain, nausea, vomiting, dysphagia, diarrhea or constipation. No rectal bleeding, melena, or hematemesis.  Neuro:  No tingling, numbness       VITALS:   T(F): 96.8 (01-12-23 @ 14:05), Max: 98.1 (01-12-23 @ 05:10)  HR: 77 (01-12-23 @ 14:05) (71 - 84)  BP: 138/63 (01-12-23 @ 14:05) (134/67 - 142/60)  RR: 18 (01-12-23 @ 14:05) (18 - 18)  SpO2: 97% (01-11-23 @ 15:09) (97% - 97%)    PHYSICAL EXAM:  GENERAL: AAOx3, no acute distress.  HEAD:  Atraumatic, Normocephalic  EYES: conjunctiva and sclera clear  NECK: Supple, No thyromegaly   CHEST/LUNG: Clear to auscultation bilaterally; No wheeze, rhonchi, or rales  HEART: Regular rate and rhythm; normal S1, S2, No murmurs.  ABDOMEN: Soft, nontender, nondistended; Bowel sounds present  NEUROLOGY: No asterixis or tremor  SKIN: Intact, no jaundice          LABS:  01-12    141  |  107  |  36<H>  ----------------------------<  99  4.1   |  21  |  1.0    Ca    8.5      12 Jan 2023 06:45    TPro  5.0<L>  /  Alb  2.8<L>  /  TBili  1.1  /  DBili  x   /  AST  97<H>  /  ALT  47<H>  /  AlkPhos  444<H>  01-12                          8.1    6.71  )-----------( 207      ( 12 Jan 2023 06:45 )             24.4     LIVER FUNCTIONS - ( 12 Jan 2023 06:45 )  Alb: 2.8 g/dL / Pro: 5.0 g/dL / ALK PHOS: 444 U/L / ALT: 47 U/L / AST: 97 U/L / GGT: x           PT/INR - ( 11 Jan 2023 15:36 )   PT: 12.20 sec;   INR: 1.07 ratio         PTT - ( 11 Jan 2023 15:36 )  PTT:32.7 sec    IMAGING:    < from: CT Abdomen and Pelvis w/ IV Cont (01.11.23 @ 17:07) >    ACC: [id number] EXAM:  CT ABDOMEN AND PELVIS IC                          PROCEDURE DATE:  01/11/2023          INTERPRETATION:  CLINICAL STATEMENT: Diffuse abdominal pain    TECHNIQUE: Contiguous axial CT images were obtained from the lower chest   tothe pubic symphysis following administration of 100cc Optiray 320   intravenous contrast.  Oral contrast was not administered.  Reformatted   images in the coronal and sagittal planes were acquired.    COMPARISON CT: None.    OTHER STUDIES USED FOR CORRELATION: None.      FINDINGS:    LOWER CHEST: Partially imaged are acute pulmonary emboli within the right   lower lobar pulmonary arteries. Small left pleural effusion and left   pleural ossified plaques.    HEPATOBILIARY: Diffuse hepatic hypodense metastases. Irregular shaped   gallbladder with diffuse asymmetric wall thickening measuring up to 1.4   cm, consistent with given history of gallbladder carcinoma.    SPLEEN: Unremarkable.    PANCREAS: Unremarkable.    ADRENAL GLANDS: Unremarkable.    KIDNEYS: Enhance symmetrically without hydroureteronephrosis.    ABDOMINOPELVIC NODES: Diffuse retroperitoneal lymphadenopathy and   periportal lymphadenopathy.    PELVIC ORGANS: Unremarkable.    PERITONEUM/MESENTERY/BOWEL: No bowel obstruction orpneumoperitoneum.   Small pelvic ascites.    BONES/SOFT TISSUES: Diffuse anasarca. Osteopenia with degenerative   changes of the sacroiliac joints, hips and spine.    OTHER: Atherosclerotic calcifications of the aorta and iliac vessels.      IMPRESSION: Gallbladder with diffuse asymmetric wall thickening up to 1.4   cm, consistent with given history of gallbladder carcinoma.    Diffuse hepatic metastases, retroperitoneal and periportal   lymphadenopathy.    Small pelvic ascites.    Small left pleural effusion.    Acute pulmonary emboli in the right lower lobar pulmonary arteries,   partially imaged.        Spoke with Dr. TAFOYA on 1/11/2023 5:39 PM with readback.    --- End of Report ---            FLOYD MCKENZIE MD; Attending Radiologist  This document has been electronically signed. Jan 11 2023  5:39PM    < end of copied text >       Chief complaint/Reason for consult: gallbladder neoplasm wit liver mets    HPI:  Patient is a 82 year old Female with past medical history of GB cancer with Liver mets, breast cancer s/p mastectomy, HTN, HLD, was brought into the ER by child stating her leg swelling is not improving with outpatient Lasix and uncontrollable abdominal pain. Morphine has improved the pain. Patient was scheduled to have an EGD with EUS tomorrow (1/12/2023). Son denies any fevers, chills, nausea, vomiting, chest pain, SOB, diarrhea, constipation, jaundice.  (11 Jan 2023 18:13)     ID#[id number]  GI Updates: 82yFemale [facility] GB cancer with liver mets, breast cancer s/p mastectomy, HTN admitted with anasarca and abdominal pain. Abdominal pain is mainly RUQ , moderate in intensity, with no aggravating or relieving factors.  Currently Patient denies nausea, vomiting, hematemesis, melena, blood in stool, diarrhea, constipation, abdominal pain.      PAST MEDICAL & SURGICAL HISTORY:   Gallbladder neoplasm      Liver metastases      Hypertension      S/P mastectomy            Family history:  FAMILY HISTORY:  No pertinent family history in first degree relatives      No GI cancers in first or second degree relatives    Social History: No smoking. No alcohol. No illegal drug use.    Allergies:   aspirin (Unknown)  penicillin (Unknown)      MEDICATIONS: Home Medications:  atorvastatin 10 mg oral tablet: 1 tab(s) orally once a day (at bedtime) (11 Jan 2023 18:21)  furosemide 20 mg oral tablet: 1 tab(s) orally once a day (11 Jan 2023 18:21)  losartan 50 mg oral tablet: 1 tab(s) orally once a day (11 Jan 2023 18:21)  meclizine 25 mg oral tablet: 1 tab(s) orally once a day, As Needed (11 Jan 2023 18:21)  memantine 10 mg oral tablet: 1 tab(s) orally 2 times a day (11 Jan 2023 18:21)  Vascepa 1 g oral capsule: 2 cap(s) orally 2 times a day (11 Jan 2023 18:21)    MEDICATIONS  (STANDING):  atorvastatin 10 milliGRAM(s) Oral at bedtime  chlorhexidine 4% Liquid 1 Application(s) Topical <User Schedule>  enoxaparin Injectable 70 milliGRAM(s) SubCutaneous every 12 hours  furosemide    Tablet 20 milliGRAM(s) Oral every 12 hours  influenza  Vaccine (HIGH DOSE) 0.7 milliLiter(s) IntraMuscular once  losartan 50 milliGRAM(s) Oral daily  memantine 10 milliGRAM(s) Oral two times a day  naloxone Injectable 0.4 milliGRAM(s) IV Push once  omega-3-Acid Ethyl Esters 2 Gram(s) Oral two times a day  pantoprazole    Tablet 40 milliGRAM(s) Oral before breakfast  polyethylene glycol 3350 17 Gram(s) Oral daily  senna 2 Tablet(s) Oral at bedtime    MEDICATIONS  (PRN):  acetaminophen     Tablet .. 650 milliGRAM(s) Oral every 6 hours PRN Temp greater or equal to 38C (100.4F), Mild Pain (1 - 3)  aluminum hydroxide/magnesium hydroxide/simethicone Suspension 30 milliLiter(s) Oral every 4 hours PRN Dyspepsia  bisacodyl 5 milliGRAM(s) Oral daily PRN Constipation  meclizine 25 milliGRAM(s) Oral daily PRN Dizziness  melatonin 3 milliGRAM(s) Oral at bedtime PRN Insomnia  morphine  - Injectable 2 milliGRAM(s) IV Push every 4 hours PRN Moderate Pain (4 - 6)  morphine  - Injectable 4 milliGRAM(s) IV Push every 4 hours PRN Severe Pain (7 - 10)  ondansetron Injectable 4 milliGRAM(s) IV Push every 8 hours PRN Nausea and/or Vomiting        REVIEW OF SYSTEMS  General:  No , fevers, or chills.  Eyes:  No reported pain or visual changes  ENT:  No sore throat or runny nose.  NECK: No stiffness or lymphadenopathy  CV:  No chest pain or palpitations.  Resp:  No shortness of breath, cough, wheezing or hemoptysis  GI:  No  nausea, vomiting, dysphagia, diarrhea or constipation. No rectal bleeding, melena, or hematemesis.  Neuro:  No tingling, numbness       VITALS:   T(F): 96.8 (01-12-23 @ 14:05), Max: 98.1 (01-12-23 @ 05:10)  HR: 77 (01-12-23 @ 14:05) (71 - 84)  BP: 138/63 (01-12-23 @ 14:05) (134/67 - 142/60)  RR: 18 (01-12-23 @ 14:05) (18 - 18)  SpO2: 97% (01-11-23 @ 15:09) (97% - 97%)    PHYSICAL EXAM:  GENERAL: AAOx3, no acute distress.  HEAD:  Atraumatic, Normocephalic  EYES: conjunctiva and sclera clear  NECK: Supple, No thyromegaly   CHEST/LUNG: Clear to auscultation bilaterally; No wheeze, rhonchi, or rales  HEART: Regular rate and rhythm; normal S1, S2, No murmurs.  ABDOMEN: Soft, +RUQ tenderness, nondistended; Bowel sounds present  NEUROLOGY: No asterixis or tremor  SKIN: Intact, no jaundice          LABS:  01-12    141  |  107  |  36<H>  ----------------------------<  99  4.1   |  21  |  1.0    Ca    8.5      12 Jan 2023 06:45    TPro  5.0<L>  /  Alb  2.8<L>  /  TBili  1.1  /  DBili  x   /  AST  97<H>  /  ALT  47<H>  /  AlkPhos  444<H>  01-12                          8.1    6.71  )-----------( 207      ( 12 Jan 2023 06:45 )             24.4     LIVER FUNCTIONS - ( 12 Jan 2023 06:45 )  Alb: 2.8 g/dL / Pro: 5.0 g/dL / ALK PHOS: 444 U/L / ALT: 47 U/L / AST: 97 U/L / GGT: x           PT/INR - ( 11 Jan 2023 15:36 )   PT: 12.20 sec;   INR: 1.07 ratio         PTT - ( 11 Jan 2023 15:36 )  PTT:32.7 sec    IMAGING:    < from: CT Abdomen and Pelvis w/ IV Cont (01.11.23 @ 17:07) >    ACC: [id number] EXAM:  CT ABDOMEN AND PELVIS IC                          PROCEDURE DATE:  01/11/2023          INTERPRETATION:  CLINICAL STATEMENT: Diffuse abdominal pain    TECHNIQUE: Contiguous axial CT images were obtained from the lower chest   tothe pubic symphysis following administration of 100cc Optiray 320   intravenous contrast.  Oral contrast was not administered.  Reformatted   images in the coronal and sagittal planes were acquired.    COMPARISON CT: None.    OTHER STUDIES USED FOR CORRELATION: None.      FINDINGS:    LOWER CHEST: Partially imaged are acute pulmonary emboli within the right   lower lobar pulmonary arteries. Small left pleural effusion and left   pleural ossified plaques.    HEPATOBILIARY: Diffuse hepatic hypodense metastases. Irregular shaped   gallbladder with diffuse asymmetric wall thickening measuring up to 1.4   cm, consistent with given history of gallbladder carcinoma.    SPLEEN: Unremarkable.    PANCREAS: Unremarkable.    ADRENAL GLANDS: Unremarkable.    KIDNEYS: Enhance symmetrically without hydroureteronephrosis.    ABDOMINOPELVIC NODES: Diffuse retroperitoneal lymphadenopathy and   periportal lymphadenopathy.    PELVIC ORGANS: Unremarkable.    PERITONEUM/MESENTERY/BOWEL: No bowel obstruction or pneumoperitoneum.   Small pelvic ascites.    BONES/SOFT TISSUES: Diffuse anasarca. Osteopenia with degenerative   changes of the sacroiliac joints, hips and spine.    OTHER: Atherosclerotic calcifications of the aorta and iliac vessels.      IMPRESSION: Gallbladder with diffuse asymmetric wall thickening up to 1.4   cm, consistent with given history of gallbladder carcinoma.    Diffuse hepatic metastases, retroperitoneal and periportal   lymphadenopathy.    Small pelvic ascites.    Small left pleural effusion.    Acute pulmonary emboli in the right lower lobar pulmonary arteries,   partially imaged.        Spoke with Dr. TAFOYA on 1/11/2023 5:39 PM with readback.    --- End of Report ---            FLOYD MCKENZIE MD; Attending Radiologist  This document has been electronically signed. Jan 11 2023  5:39PM    < end of copied text >

## 2023-01-12 NOTE — HOSPICE CARE NOTE - CONVESATION DETAILS
Call placed to patient's [place] with the assistance of a Cantonese  [phone] to review SNF placement with hospice services. [facility] states she is seeking placement at [facility] located at [address] [place] [address] as they have a dedicated unit for Cantonese speaking residents. Please initiate LYNDA for placement with request for hospice services with facility's preferred Hospice provider. Hospice consult closed.

## 2023-01-12 NOTE — CONSULT NOTE ADULT - ASSESSMENT
82[facility] GB cancer with liver mets, breast cancer s/p mastectomy, HTN admitted with anasarca and abdominal pain.      Problem 1-Gallbladder with diffuse asymmetric wall thickening up to 1.4   cm, consistent with given history of gallbladder carcinoma.  Diffuse hepatic metastases, retroperitoneal and periportal   lymphadenopathy.  Rec  -altered LFTs compatible with mets  -Pain management consult  -daughter wants biopsy for tissue diagnosis  -Please consult IR for liver met sampling  -Discussed case with advanced GI gallbladder sampling via EUS is dangerous especially when we have option for IR liver met sampling   -Oncology consult    Problem 2-Acute pulmonary emboli in the right lower lobar pulmonary arteries,   partially imaged.  Rec  - pulm consult    Recall GI if needed   82yFemale [facility] GB cancer with liver mets, breast cancer s/p mastectomy, HTN admitted with anasarca and abdominal pain.      Problem 1-Gallbladder with diffuse asymmetric wall thickening up to 1.4   cm, consistent with given history of gallbladder carcinoma.  Diffuse hepatic metastases, retroperitoneal and periportal   lymphadenopathy.  Rec  -altered LFTs compatible with mets  -Pain management consult  -daughter wants biopsy for tissue diagnosis  -Please consult IR to consider  liver mets biopsy   -Discussed case with advanced GI, biopsy would be preferable by IR    -Oncology consult  -discussed with patient and daughter at bed side     Problem 2-Acute pulmonary emboli in the right lower lobar pulmonary arteries,   partially imaged.  Rec  - pulm consult  -Management by primary team    Recall GI if needed

## 2023-01-12 NOTE — CONSULT NOTE ADULT - PROBLEM SELECTOR RECOMMENDATION 3
- d/w daughter and family ( #[id number])  - given recent diagnosis, family opting for continued medical care for now and workup, and are open to disease-directed therapy  - patient is DNR

## 2023-01-12 NOTE — CONSULT NOTE ADULT - PROBLEM SELECTOR RECOMMENDATION 4
- will follow  ______________  Bharath Eden MD  Palliative Medicine  [facility]  [phone]

## 2023-01-12 NOTE — CONSULT NOTE ADULT - ASSESSMENT
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 and the patient who was relatively frail now with ECOG of approximately 2-3  -I explained the treatment options will depend on confirmational pathology and family wanted to pursue biopsy and she is pending a biopsy of liver metastases tomorrow with interventional radiology  -Explained that in the frail patient does supportive measures are reasonable otherwise options may include single agent chemotherapy such as gemcitabine and capecitabine, possible immunotherapy or possible targeted therapy which a different toxicity profile response rates but in general I explained to the patient and the family prognosis is approximately 10 to 12 months at best with treatment without treatment likely 2 to 6 months at best  -She can be discharged postbiopsy tomorrow I will follow-up as outpatient depending on all the ancillary studies and her performance status they will decide on treatment plan    #Venous thromboembolism in the setting of cancer  -Currently on Lovenox last dose should be 24 hours prior to procedure agree with Eliquis on discharge    #Pain due to malignancy palliative care is following and managing    Will arrange follow-up once patient is discharged

## 2023-01-12 NOTE — CONSULT NOTE ADULT - SUBJECTIVE AND OBJECTIVE BOX
INTERVENTIONAL RADIOLOGY CONSULT:     Procedure Requested:     HPI:  Patient is a 82 year old Female with past medical history of GB cancer with Liver mets, breast cancer s/p mastectomy, HTN, HLD, was brought into the ER by child stating her leg swelling is not improving with outpatient Lasix and uncontrollable abdominal pain. Morphine has improved the pain. Patient was scheduled to have an EGD with EUS tomorrow (1/12/2023). Son denies any fevers, chills, nausea, vomiting, chest pain, SOB, diarrhea, constipation, jaundice.  (11 Jan 2023 18:13)      PAST MEDICAL & SURGICAL HISTORY:  Gallbladder neoplasm      Liver metastases      Hypertension      S/P mastectomy          MEDICATIONS  (STANDING):  atorvastatin 10 milliGRAM(s) Oral at bedtime  chlorhexidine 4% Liquid 1 Application(s) Topical <User Schedule>  enoxaparin Injectable 70 milliGRAM(s) SubCutaneous every 12 hours  furosemide    Tablet 20 milliGRAM(s) Oral every 12 hours  influenza  Vaccine (HIGH DOSE) 0.7 milliLiter(s) IntraMuscular once  losartan 50 milliGRAM(s) Oral daily  memantine 10 milliGRAM(s) Oral two times a day  naloxone Injectable 0.4 milliGRAM(s) IV Push once  omega-3-Acid Ethyl Esters 2 Gram(s) Oral two times a day  pantoprazole    Tablet 40 milliGRAM(s) Oral before breakfast  polyethylene glycol 3350 17 Gram(s) Oral daily  senna 2 Tablet(s) Oral at bedtime    MEDICATIONS  (PRN):  acetaminophen     Tablet .. 650 milliGRAM(s) Oral every 6 hours PRN Temp greater or equal to 38C (100.4F), Mild Pain (1 - 3)  aluminum hydroxide/magnesium hydroxide/simethicone Suspension 30 milliLiter(s) Oral every 4 hours PRN Dyspepsia  bisacodyl 5 milliGRAM(s) Oral daily PRN Constipation  meclizine 25 milliGRAM(s) Oral daily PRN Dizziness  melatonin 3 milliGRAM(s) Oral at bedtime PRN Insomnia  morphine  - Injectable 2 milliGRAM(s) IV Push every 4 hours PRN Moderate Pain (4 - 6)  morphine  - Injectable 4 milliGRAM(s) IV Push every 4 hours PRN Severe Pain (7 - 10)  ondansetron Injectable 4 milliGRAM(s) IV Push every 8 hours PRN Nausea and/or Vomiting      Allergies    aspirin (Unknown)  penicillin (Unknown)    Intolerances    FAMILY HISTORY:  No pertinent family history in first degree relatives        Physical Exam:   Vital Signs Last 24 Hrs  T(C): 36 (12 Jan 2023 14:05), Max: 36.7 (12 Jan 2023 05:10)  T(F): 96.8 (12 Jan 2023 14:05), Max: 98.1 (12 Jan 2023 05:10)  HR: 77 (12 Jan 2023 14:05) (77 - 84)  BP: 138/63 (12 Jan 2023 14:05) (134/67 - 142/60)  BP(mean): --  RR: 18 (12 Jan 2023 14:05) (18 - 18)  SpO2: --    Labs:                         8.1    6.71  )-----------( 207      ( 12 Jan 2023 06:45 )             24.4     01-12    141  |  107  |  36<H>  ----------------------------<  99  4.1   |  21  |  1.0    Ca    8.5      12 Jan 2023 06:45    TPro  5.0<L>  /  Alb  2.8<L>  /  TBili  1.1  /  DBili  x   /  AST  97<H>  /  ALT  47<H>  /  AlkPhos  444<H>  01-12    PT/INR - ( 11 Jan 2023 15:36 )   PT: 12.20 sec;   INR: 1.07 ratio         PTT - ( 11 Jan 2023 15:36 )  PTT:32.7 sec    Pertinent labs:                      8.1    6.71  )-----------( 207      ( 12 Jan 2023 06:45 )             24.4       01-12    141  |  107  |  36<H>  ----------------------------<  99  4.1   |  21  |  1.0    Ca    8.5      12 Jan 2023 06:45    TPro  5.0<L>  /  Alb  2.8<L>  /  TBili  1.1  /  DBili  x   /  AST  97<H>  /  ALT  47<H>  /  AlkPhos  444<H>  01-12      PT/INR - ( 11 Jan 2023 15:36 )   PT: 12.20 sec;   INR: 1.07 ratio         PTT - ( 11 Jan 2023 15:36 )  PTT:32.7 sec    Radiology & Additional Studies:   Radiology imaging reviewed.       ASSESSMENT/ PLAN:   Patient is a 82 year old Female with past medical history of GB cancer with Liver mets, breast cancer s/p mastectomy, HTN, HLD, was brought into the ER by child stating her leg swelling is not improving with outpatient Lasix and uncontrollable abdominal pain. Morphine has improved the pain. Patient was scheduled to have an EGD with EUS tomorrow (1/12/2023). Patients family was considering hospice care but would now like a second opinion from heme/onc and gallbladder biopsy for diagnosis. GI consulted for EUS, note stating EUS is dangerous and recommended IR for liver biopsy.  - potential add on for tomorrow   - keep NPO after midnight   - hold Lovenox 70mg tonight and tomorrow mornings dose   - labs WNL  - COVID 1/11 - ok for procedure   - will follow up in AM   - if unable to do biopsy tomorrow, can schedule next week as outpatient     Thank you for the courtesy of this consult, please call [phone] with any further questions.

## 2023-01-12 NOTE — CONSULT NOTE ADULT - SUBJECTIVE AND OBJECTIVE BOX
HPI: 82F with PMH of gallbladder cancer with liver mets, breast cancer s/p mastectomy, HTN, and HLD here with LE edema and abdominal pain. CT abdomen showed diffuse hepatic mets; patient was initially scheduled for EGD 1/12/23 as outpatient. Outpatient oncology reportedly recommended hospice. Family is considering second opinion from oncology for palliative disease-directed therapy, vs hospice care. Is on lovenox for PE. Is on morphine 2mg IV and 4mg IV PRN, used 2mg IV x 1/24 hours. Palliative care called for symptoms and GOC.    PERTINENT PM/SXH:   Gallbladder neoplasm    Liver metastases    Hypertension    S/P mastectomy      FAMILY HISTORY:  No pertinent family history in first degree relatives      ITEMS NOT CHECKED ARE NOT PRESENT    SOCIAL HISTORY:   Significant other/partner[ ]  Children[ ]  Temple/Spirituality:  Substance hx:  [ ]   Tobacco hx:  [ ]   Alcohol hx: [ ]   Living Situation: [ ]Home  [ ]Long term care  [ ]Rehab [ ]Other  Home Services: [ ] HHA [ ] Visting RN [ ] Hospice  Occupation:  Home Opioid hx:  [ ] Y [ ] N [ ] I-Stop Reference No:    ADVANCE DIRECTIVES:    [ ] Full Code [X ] DNR  MOLST  [ ]  Living Will  [ ]   DECISION MAKER(s):  [ ] Health Care Proxy(s)  [ ] Surrogate(s)  [ ] Guardian           Name(s): Phone Number(s):  Daughter [phone]; son-in-law [phone]    BASELINE (I)ADL(s) (prior to admission):  [place]: [ ]Total  [ ] Moderate [ ]Dependent  Palliative Performance Status Version 2:         %    http://npcrc.org/files/news/palliative_performance_scale_ppsv2.pdf    Allergies    aspirin (Unknown)  penicillin (Unknown)    Intolerances    MEDICATIONS  (STANDING):  atorvastatin 10 milliGRAM(s) Oral at bedtime  chlorhexidine 4% Liquid 1 Application(s) Topical <User Schedule>  enoxaparin Injectable 70 milliGRAM(s) SubCutaneous every 12 hours  furosemide    Tablet 20 milliGRAM(s) Oral every 12 hours  influenza  Vaccine (HIGH DOSE) 0.7 milliLiter(s) IntraMuscular once  losartan 50 milliGRAM(s) Oral daily  memantine 10 milliGRAM(s) Oral two times a day  naloxone Injectable 0.4 milliGRAM(s) IV Push once  omega-3-Acid Ethyl Esters 2 Gram(s) Oral two times a day  pantoprazole    Tablet 40 milliGRAM(s) Oral before breakfast  polyethylene glycol 3350 17 Gram(s) Oral daily  senna 2 Tablet(s) Oral at bedtime    MEDICATIONS  (PRN):  acetaminophen     Tablet .. 650 milliGRAM(s) Oral every 6 hours PRN Temp greater or equal to 38C (100.4F), Mild Pain (1 - 3)  aluminum hydroxide/magnesium hydroxide/simethicone Suspension 30 milliLiter(s) Oral every 4 hours PRN Dyspepsia  bisacodyl 5 milliGRAM(s) Oral daily PRN Constipation  meclizine 25 milliGRAM(s) Oral daily PRN Dizziness  melatonin 3 milliGRAM(s) Oral at bedtime PRN Insomnia  morphine  - Injectable 2 milliGRAM(s) IV Push every 4 hours PRN Moderate Pain (4 - 6)  morphine  - Injectable 4 milliGRAM(s) IV Push every 4 hours PRN Severe Pain (7 - 10)  ondansetron Injectable 4 milliGRAM(s) IV Push every 8 hours PRN Nausea and/or Vomiting    PRESENT SYMPTOMS: [ ]Unable to obtain due to poor mentation   Source if other than patient:  [ ]Family   [ ]Team     Pain: [ ]yes [ ]no  QOL impact -   Location -                    Aggravating factors -  Quality -  Radiation -  Timing-  Severity (0-10 scale):  Minimal acceptable level (0-10 scale):     CPOT:    https://www.[facility].org/getattachment/[email or web address]/Critical-Care-Pain-Observation-Tool-(CPOT)      PAIN AD Score:     http://geriatrictoolkit.[email or web address]/cog/painad.pdf (press ctrl +  left click to view)    Dyspnea:                           [ ]Mild [ ]Moderate [ ]Severe  Anxiety:                             [ ]Mild [ ]Moderate [ ]Severe  Fatigue:                             [ ]Mild [ ]Moderate [ ]Severe  Nausea:                             [ ]Mild [ ]Moderate [ ]Severe  Loss of appetite:              [ ]Mild [ ]Moderate [ ]Severe  Constipation:                    [ ]Mild [ ]Moderate [ ]Severe    Other Symptoms:  [ ]All other review of systems negative     Palliative Performance Status Version 2:         %    http://npcrc.org/files/news/palliative_performance_scale_ppsv2.pdf  PHYSICAL EXAM:  Vital Signs Last 24 Hrs  T(C): 36.7 (12 Jan 2023 05:10), Max: 36.7 (12 Jan 2023 05:10)  T(F): 98.1 (12 Jan 2023 05:10), Max: 98.1 (12 Jan 2023 05:10)  HR: 84 (12 Jan 2023 05:10) (71 - 84)  BP: 142/60 (12 Jan 2023 05:10) (134/67 - 142/60)  BP(mean): --  RR: 18 (11 Jan 2023 15:09) (18 - 18)  SpO2: 97% (11 Jan 2023 15:09) (97% - 97%)    Parameters below as of 11 Jan 2023 15:09  Patient On (Oxygen Delivery Method): room air     I&O's Summary      GENERAL:  [ ] No acute distress [ ]Lethargic  [ ]Unarousable  [ ]Verbal  [ ]Non-Verbal [ ]Cachexia    BEHAVIORAL/PSYCH:  [ ]Alert and Oriented x  [ ] Anxiety [ ] Delirium [ ] Agitation [ X] Calm   EYES: [ ] No scleral icterus [ ] Scleral icterus [ ] Closed  ENMT:  [ ]Dry mouth  [ ]No oral lesions [ ] No external ear or nose lesions  CARDIOVASCULAR:  [ ]Regular [ ]Irregular [ ]Tachy [ ]Not Tachy  [ ]Ulises [ ] Edema  PULMONARY:  [ ]Tachypnea  [ ]Audible excessive secretions [ X] No labored breathing [ ] labored breathing  GASTROINTESTINAL: [ ]Soft  [ ]Distended  [ ] Not distended [ ]Non tender [ ]Tender    MUSCULOSKELETAL: [ ]No clubbing [ ] clubbing  [ ] No cyanosis [ ] cyanosis  NEUROLOGIC: [ ]No focal deficits [ ] Follows commands [ ] Does not follow commands  [ ]Cognitive impairment  [ ]Dysphagia [ ]Dysarthria [ ]Paresis   GENITOURINARY: [ ]Normal [ ] Incontinent   [ ]Oliguria/Anuria   [ ]Logan  SKIN: [ ] Jaundiced [ X] Non-jaundiced [ ]Rash [ ]No Rash [ ] Warm [ ] Dry  MISC/LINES: [ ] ET tube [ ] Trach [ ]NGT/OGT [ ]PEG [ ]Logan    CRITICAL CARE:  [ ] Shock Present  [ ]Septic [ ]Cardiogenic [ ]Neurologic [ ]Hypovolemic  [ ]  Vasopressors [ ]  Inotropes   [ ]Respiratory failure present [ ]Mechanical ventilation [ ]Non-invasive ventilatory support [ ]High flow  [ ]Acute  [ ]Chronic [ ]Hypoxic  [ ]Hypercarbic [ ]Other  [ ]Other organ failure     LABS:                        8.1    6.71  )-----------( 207      ( 12 Jan 2023 06:45 )             24.4   01-12    141  |  107  |  36<H>  ----------------------------<  99  4.1   |  21  |  1.0    Ca    8.5      12 Jan 2023 06:45    TPro  5.0<L>  /  Alb  2.8<L>  /  TBili  1.1  /  DBili  x   /  AST  97<H>  /  ALT  47<H>  /  AlkPhos  444<H>  01-12  PT/INR - ( 11 Jan 2023 15:36 )   PT: 12.20 sec;   INR: 1.07 ratio         PTT - ( 11 Jan 2023 15:36 )  PTT:32.7 sec      RADIOLOGY & ADDITIONAL STUDIES:  CTA 1/11/23    Pulmonary embolism in the right lower lobe pulmonary artery with   extension into multiple segmental and subsegmental branches.    PROTEIN CALORIE MALNUTRITION PRESENT: [ ]mild [ ]moderate [ ]severe [ ]underweight [ ]morbid obesity  https://www.andeal.org/vault/2440/web/files/ONC/Table_Clinical%20Characteristics%20to%20Document%20Malnutrition-White%20JV%20et%20al%202012.pdf    Height (cm): 160 (01-11-23 @ 18:00)  Weight (kg): 54.5 (01-11-23 @ 18:00)  BMI (kg/m2): 21.3 (01-11-23 @ 18:00)    [ ]PPSV2 < or = to 30% [ ]significant weight loss  [ ]poor nutritional intake  [ ]anasarca      [ ]Artificial Nutrition      REFERRALS:   [ ]Chaplaincy  [ ]Hospice  [ ]Child Life  [ ]Social Work  [ ]Case management [ ]Holistic Therapy     Goals of Care Document:     ______________  Bharath Eden MD  Palliative Medicine  [facility]   of Geriatric and Palliative Medicine  [phone]       HPI: 82F with PMH of gallbladder cancer with liver mets, breast cancer s/p mastectomy, HTN, and HLD here with LE edema and abdominal pain. CT abdomen showed diffuse hepatic mets; patient was initially scheduled for EGD 1/12/23 as outpatient. Outpatient oncology reportedly recommended hospice. Family is considering second opinion from oncology for palliative disease-directed therapy, vs hospice care. Is on lovenox for PE. Is on morphine 2mg IV and 4mg IV PRN, used 2mg IV x 1/24 hours. Palliative care called for symptoms and GOC.    PERTINENT PM/SXH:   Gallbladder neoplasm    Liver metastases    Hypertension    S/P mastectomy      FAMILY HISTORY:  Cannot obtain from patient    ITEMS NOT CHECKED ARE NOT PRESENT    SOCIAL HISTORY:   Significant other/partner[ ]  Children[ X]  Yazidi/Spirituality:  Substance hx:  [ ]   Tobacco hx:  [ ]   Alcohol hx: [ ]   Living Situation: [X ]Home  [ ]Long term care  [ ]Rehab [ ]Other  Home Services: [ ] HHA [ ] Visting RN [ ] Hospice  Occupation:  Home Opioid hx:  [ ] Y [ ] N [ ] I-Stop Reference No:    ADVANCE DIRECTIVES:    [ ] Full Code [X ] DNR  MOLST  [ ]  Living Will  [ ]   DECISION MAKER(s):  [ ] Health Care Proxy(s)  [ ] Surrogate(s)  [ ] Guardian           Name(s): Phone Number(s):  Daughter [phone]; son-in-law [phone]    BASELINE (I)ADL(s) (prior to admission):  [place]: [ ]Total  [ ] Moderate [ ]Dependent  Palliative Performance Status Version 2:         %    http://npcrc.org/files/news/palliative_performance_scale_ppsv2.pdf    Allergies    aspirin (Unknown)  penicillin (Unknown)    Intolerances    MEDICATIONS  (STANDING):  atorvastatin 10 milliGRAM(s) Oral at bedtime  chlorhexidine 4% Liquid 1 Application(s) Topical <User Schedule>  enoxaparin Injectable 70 milliGRAM(s) SubCutaneous every 12 hours  furosemide    Tablet 20 milliGRAM(s) Oral every 12 hours  influenza  Vaccine (HIGH DOSE) 0.7 milliLiter(s) IntraMuscular once  losartan 50 milliGRAM(s) Oral daily  memantine 10 milliGRAM(s) Oral two times a day  naloxone Injectable 0.4 milliGRAM(s) IV Push once  omega-3-Acid Ethyl Esters 2 Gram(s) Oral two times a day  pantoprazole    Tablet 40 milliGRAM(s) Oral before breakfast  polyethylene glycol 3350 17 Gram(s) Oral daily  senna 2 Tablet(s) Oral at bedtime    MEDICATIONS  (PRN):  acetaminophen     Tablet .. 650 milliGRAM(s) Oral every 6 hours PRN Temp greater or equal to 38C (100.4F), Mild Pain (1 - 3)  aluminum hydroxide/magnesium hydroxide/simethicone Suspension 30 milliLiter(s) Oral every 4 hours PRN Dyspepsia  bisacodyl 5 milliGRAM(s) Oral daily PRN Constipation  meclizine 25 milliGRAM(s) Oral daily PRN Dizziness  melatonin 3 milliGRAM(s) Oral at bedtime PRN Insomnia  morphine  - Injectable 2 milliGRAM(s) IV Push every 4 hours PRN Moderate Pain (4 - 6)  morphine  - Injectable 4 milliGRAM(s) IV Push every 4 hours PRN Severe Pain (7 - 10)  ondansetron Injectable 4 milliGRAM(s) IV Push every 8 hours PRN Nausea and/or Vomiting    PRESENT SYMPTOMS: [ X]Unable to obtain due to patient sleeping   Source if other than patient:  [ ]Family   [ ]Team     Pain: [ ]yes [ ]no  QOL impact -   Location -                    Aggravating factors -  Quality -  Radiation -  Timing-  Severity (0-10 scale):  Minimal acceptable level (0-10 scale):     CPOT:    https://www.[facility].org/getattachment/[email or web address]/Critical-Care-Pain-Observation-Tool-(CPOT)      PAIN AD Score: 0    http://geriatrictoolkit.[place].[facility]/cog/painad.pdf (press ctrl +  left click to view)    Dyspnea:                           [ ]Mild [ ]Moderate [ ]Severe  Anxiety:                             [ ]Mild [ ]Moderate [ ]Severe  Fatigue:                             [ ]Mild [ ]Moderate [ ]Severe  Nausea:                             [ ]Mild [ ]Moderate [ ]Severe  Loss of appetite:              [ ]Mild [ ]Moderate [ ]Severe  Constipation:                    [ ]Mild [ ]Moderate [ ]Severe    Other Symptoms:  [ ]All other review of systems negative     Palliative Performance Status Version 2:         %    http://npcrc.org/files/news/palliative_performance_scale_ppsv2.pdf  PHYSICAL EXAM:  Vital Signs Last 24 Hrs  T(C): 36.7 (12 Jan 2023 05:10), Max: 36.7 (12 Jan 2023 05:10)  T(F): 98.1 (12 Jan 2023 05:10), Max: 98.1 (12 Jan 2023 05:10)  HR: 84 (12 Jan 2023 05:10) (71 - 84)  BP: 142/60 (12 Jan 2023 05:10) (134/67 - 142/60)  BP(mean): --  RR: 18 (11 Jan 2023 15:09) (18 - 18)  SpO2: 97% (11 Jan 2023 15:09) (97% - 97%)    Parameters below as of 11 Jan 2023 15:09  Patient On (Oxygen Delivery Method): room air     I&O's Summary      GENERAL:  [ X] No acute distress [ ]Lethargic  [ ]Unarousable  [ ]Verbal  [ ]Non-Verbal [ ]Cachexia    BEHAVIORAL/PSYCH:  [ ]Alert and Oriented x  [ ] Anxiety [ ] Delirium [ ] Agitation [ X] Calm   EYES: [ ] No scleral icterus [ ] Scleral icterus [ X] Closed  ENMT:  [ ]Dry mouth  [ ]No oral lesions [X ] No external ear or nose lesions  CARDIOVASCULAR:  [ ]Regular [ ]Irregular [ ]Tachy [ ]Not Tachy  [ ]Ulises [ ] Edema  PULMONARY:  [ ]Tachypnea  [ ]Audible excessive secretions [ X] No labored breathing [ ] labored breathing  GASTROINTESTINAL: [ ]Soft  [ ]Distended  [X ] Not distended [ ]Non tender [ ]Tender    MUSCULOSKELETAL: [ ]No clubbing [ ] clubbing  [ X] No cyanosis [ ] cyanosis  NEUROLOGIC: [X ]No focal deficits [ ] Follows commands [ ] Does not follow commands  [ ]Cognitive impairment  [ ]Dysphagia [ ]Dysarthria [ ]Paresis   GENITOURINARY: [ ]Normal [ ] Incontinent   [ ]Oliguria/Anuria   [ ]Logan  SKIN: [ ] Jaundiced [ X] Non-jaundiced [ ]Rash [ ]No Rash [ ] Warm [ ] Dry  MISC/LINES: [ ] ET tube [ ] Trach [ ]NGT/OGT [ ]PEG [ ]Logan    CRITICAL CARE:  [ ] Shock Present  [ ]Septic [ ]Cardiogenic [ ]Neurologic [ ]Hypovolemic  [ ]  Vasopressors [ ]  Inotropes   [ ]Respiratory failure present [ ]Mechanical ventilation [ ]Non-invasive ventilatory support [ ]High flow  [ ]Acute  [ ]Chronic [ ]Hypoxic  [ ]Hypercarbic [ ]Other  [ ]Other organ failure     LABS: reviewed                        8.1    6.71  )-----------( 207      ( 12 Jan 2023 06:45 )             24.4   01-12    141  |  107  |  36<H>  ----------------------------<  99  4.1   |  21  |  1.0    Ca    8.5      12 Jan 2023 06:45    TPro  5.0<L>  /  Alb  2.8<L>  /  TBili  1.1  /  DBili  x   /  AST  97<H>  /  ALT  47<H>  /  AlkPhos  444<H>  01-12  PT/INR - ( 11 Jan 2023 15:36 )   PT: 12.20 sec;   INR: 1.07 ratio         PTT - ( 11 Jan 2023 15:36 )  PTT:32.7 sec      RADIOLOGY & ADDITIONAL STUDIES:  CTA 1/11/23    Pulmonary embolism in the right lower lobe pulmonary artery with   extension into multiple segmental and subsegmental branches.    PROTEIN CALORIE MALNUTRITION PRESENT: [ ]mild [ ]moderate [ ]severe [ ]underweight [ ]morbid obesity  https://www.andeal.org/vault/2440/web/files/ONC/Table_Clinical%20Characteristics%20to%20Document%20Malnutrition-White%20JV%20et%20al%202012.pdf    Height (cm): 160 (01-11-23 @ 18:00)  Weight (kg): 54.5 (01-11-23 @ 18:00)  BMI (kg/m2): 21.3 (01-11-23 @ 18:00)    [ ]PPSV2 < or = to 30% [ ]significant weight loss  [ ]poor nutritional intake  [ ]anasarca      [ ]Artificial Nutrition      REFERRALS:   [ ]Chaplaincy  [ ]Hospice  [ ]Child Life  [ ]Social Work  [ ]Case management [ ]Holistic Therapy     Goals of Care Document:     ______________  Bharath Eden MD  Palliative Medicine  [facility]   of Geriatric and Palliative Medicine  [phone]

## 2023-01-13 LAB
ALBUMIN SERPL ELPH-MCNC: 2.8 G/DL — LOW (ref 3.5–5.2)
ALP SERPL-CCNC: 438 U/L — HIGH (ref 30–115)
ALT FLD-CCNC: 49 U/L — HIGH (ref 0–41)
ANION GAP SERPL CALC-SCNC: 9 MMOL/L — SIGNIFICANT CHANGE UP (ref 7–14)
AST SERPL-CCNC: 94 U/L — HIGH (ref 0–41)
BILIRUB SERPL-MCNC: 1 MG/DL — SIGNIFICANT CHANGE UP (ref 0.2–1.2)
BUN SERPL-MCNC: 29 MG/DL — HIGH (ref 10–20)
CALCIUM SERPL-MCNC: 8.5 MG/DL — SIGNIFICANT CHANGE UP (ref 8.4–10.5)
CHLORIDE SERPL-SCNC: 105 MMOL/L — SIGNIFICANT CHANGE UP (ref 98–110)
CO2 SERPL-SCNC: 26 MMOL/L — SIGNIFICANT CHANGE UP (ref 17–32)
CREAT SERPL-MCNC: 1.1 MG/DL — SIGNIFICANT CHANGE UP (ref 0.7–1.5)
EGFR: 50 ML/MIN/1.73M2 — LOW
GLUCOSE SERPL-MCNC: 109 MG/DL — HIGH (ref 70–99)
HCT VFR BLD CALC: 25.8 % — LOW (ref 37–47)
HGB BLD-MCNC: 8.5 G/DL — LOW (ref 12–16)
MCHC RBC-ENTMCNC: 32.2 PG — HIGH (ref 27–31)
MCHC RBC-ENTMCNC: 32.9 G/DL — SIGNIFICANT CHANGE UP (ref 32–37)
MCV RBC AUTO: 97.7 FL — SIGNIFICANT CHANGE UP (ref 81–99)
NRBC # BLD: 0 /100 WBCS — SIGNIFICANT CHANGE UP (ref 0–0)
PLATELET # BLD AUTO: 213 K/UL — SIGNIFICANT CHANGE UP (ref 130–400)
POTASSIUM SERPL-MCNC: 4.4 MMOL/L — SIGNIFICANT CHANGE UP (ref 3.5–5)
POTASSIUM SERPL-SCNC: 4.4 MMOL/L — SIGNIFICANT CHANGE UP (ref 3.5–5)
PROT SERPL-MCNC: 5.1 G/DL — LOW (ref 6–8)
RBC # BLD: 2.64 M/UL — LOW (ref 4.2–5.4)
RBC # FLD: 15.4 % — HIGH (ref 11.5–14.5)
SODIUM SERPL-SCNC: 140 MMOL/L — SIGNIFICANT CHANGE UP (ref 135–146)
WBC # BLD: 7.81 K/UL — SIGNIFICANT CHANGE UP (ref 4.8–10.8)
WBC # FLD AUTO: 7.81 K/UL — SIGNIFICANT CHANGE UP (ref 4.8–10.8)

## 2023-01-13 PROCEDURE — 99448 NTRPROF PH1/NTRNET/EHR 21-30: CPT

## 2023-01-13 PROCEDURE — 99232 SBSQ HOSP IP/OBS MODERATE 35: CPT

## 2023-01-13 RX ORDER — APIXABAN 2.5 MG/1
10 TABLET, FILM COATED ORAL EVERY 12 HOURS
Refills: 0 | Status: DISCONTINUED | OUTPATIENT
Start: 2023-01-13 | End: 2023-01-14

## 2023-01-13 RX ADMIN — LOSARTAN POTASSIUM 50 MILLIGRAM(S): 100 TABLET, FILM COATED ORAL at 05:50

## 2023-01-13 RX ADMIN — Medication 2 GRAM(S): at 17:22

## 2023-01-13 RX ADMIN — CHLORHEXIDINE GLUCONATE 1 APPLICATION(S): 213 SOLUTION TOPICAL at 05:51

## 2023-01-13 RX ADMIN — MEMANTINE HYDROCHLORIDE 10 MILLIGRAM(S): 10 TABLET ORAL at 05:50

## 2023-01-13 RX ADMIN — Medication 20 MILLIGRAM(S): at 05:50

## 2023-01-13 RX ADMIN — SENNA PLUS 2 TABLET(S): 8.6 TABLET ORAL at 21:13

## 2023-01-13 RX ADMIN — MEMANTINE HYDROCHLORIDE 10 MILLIGRAM(S): 10 TABLET ORAL at 17:20

## 2023-01-13 RX ADMIN — ATORVASTATIN CALCIUM 10 MILLIGRAM(S): 80 TABLET, FILM COATED ORAL at 21:13

## 2023-01-13 RX ADMIN — APIXABAN 10 MILLIGRAM(S): 2.5 TABLET, FILM COATED ORAL at 17:20

## 2023-01-13 RX ADMIN — Medication 20 MILLIGRAM(S): at 17:20

## 2023-01-13 RX ADMIN — ENOXAPARIN SODIUM 70 MILLIGRAM(S): 100 INJECTION SUBCUTANEOUS at 05:52

## 2023-01-13 RX ADMIN — Medication 2 GRAM(S): at 05:51

## 2023-01-13 RX ADMIN — POLYETHYLENE GLYCOL 3350 17 GRAM(S): 17 POWDER, FOR SOLUTION ORAL at 11:16

## 2023-01-13 NOTE — PHYSICAL THERAPY INITIAL EVALUATION ADULT - GENERAL OBSERVATIONS, REHAB EVAL
9:05 -9:30 Chart reviewed. Order received.  Patient is ok to be  seen for PT,confirmed with RN. pt encountered  Semi armas in the bed, c/o Lower back pain radiating down to the lt hip and leg , and agrees to participate in session, +  Aguilar NAD.

## 2023-01-13 NOTE — PHYSICAL THERAPY INITIAL EVALUATION ADULT - ADDITIONAL COMMENTS
pt is 81 y/o F  , lives with Daughter and   in  , information obtain from the daughter at bedside   , has 7  steps to enter with BL   HR and 14  steps to the BL HR   , pt was independent using RW / Rollator with bed mobility , transfer , ambulation ,stair negotiation and basic ADLS.

## 2023-01-13 NOTE — PHYSICAL THERAPY INITIAL EVALUATION ADULT - PERTINENT HX OF CURRENT PROBLEM, REHAB EVAL
Problem: Physical Therapy  Goal: Physical Therapy Goal  Outcome: Unable to Meet, Plan Revised     Evaluation completed, note to follow. Pt presents with impaired mental status, inability to follow verbal commands, impaired ADL function, and decreased functional mobility. She required max tactile cues to perform bed mobility and transfers. Recommending d/c to home w/ 24/7 care/assist.   Patient is a 82 year old Female with past medical history of GB cancer with Liver mets, breast cancer s/p mastectomy, HTN, HLD, was brought into the ER by child stating her leg swelling is not improving with outpatient Lasix and uncontrollable abdominal pain. Morphine has improved the pain. Patient was scheduled to have an EGD with [facility] tomorrow (1/12/2023). Son denies any fevers, chills, nausea, vomiting, chest pain, SOB, diarrhea, constipation, jaundice.

## 2023-01-13 NOTE — PHYSICAL THERAPY INITIAL EVALUATION ADULT - PHYSICAL ASSIST/NONPHYSICAL ASSIST: SIT/STAND, REHAB EVAL
How Severe Is Your Skin Lesion?: mild
Has Your Skin Lesion Been Treated?: not been treated
Is This A New Presentation, Or A Follow-Up?: Skin Lesions
Safety , Sequencing/verbal cues/1 person assist

## 2023-01-13 NOTE — HOSPICE CARE NOTE - CONVESATION DETAILS
Follow up call placed to patient's daughter Quoc to further discuss hospice services. Quoc declines hospice at this simone as she reports the patient is pending further testing  to determine if treatment is available. Quoc further states should she consider hospice services patient would require SNF placement. Hospice to follow up next week.

## 2023-01-13 NOTE — PHYSICAL THERAPY INITIAL EVALUATION ADULT - SIT-TO-STAND BALANCE
Subjective:  Maria R Gaming is a 63 year old year old female who presents for the following issues:    Abnormal stress test followed by CTA demonstrating moderate diagnoal stenosis, with medical management recommended.  Follow up with Dr. Green scheduled. No further episodes of chest pain    DM II: 16 u lantus (increased on her own, sometimes up to 18 units), metformin 850 mg BID  Last A1C 3/2017 8.6, point of care A1c today is 10.4  -checks BG every other day.  Has been running 200s-300s.  Has been eating a lot of watermelon and she believes this is contributing to her elevated blood sugars.    -Denies episodes of hypoglycemia    Also reports vulvar/vaginal itching in setting of antibiotics and high blood sugars.   Left buttock lesion  -warm compress and cephalexin by urgent care on 6/19/17, did not improve, then self-treated with preparation H, and no change.      Pertinent past medical, surgical, social and family history reviewed and updated in Epic, please refer to electronic audit trail.      ROS:   As per HPI     Objective:  Visit Vitals  /72 (BP Location: [facility] [place], Patient Position: Sitting, Cuff Size: Regular)   Pulse 58   Temp 99.2 °F (37.3 °C)   Wt 68.9 kg   BMI 27.76 kg/m²       PHYSICAL EXAMINATION:  GEN:  Alert, no acute distress  HEENT: extraocular movements intact, no scleral icterus, mucous membranes moist  NECK: supple, trachea midline  CHEST/LUNG:  No respiratory distress, good air flow to bases, clear to auscultation bilaterally  CV: S1 S2 regular rate and rhythm, no murmurs, rubs or gallops  ABD:  Soft, non-tender, no rebound or guarding  SKIN: warm, dry, no rashes or lesions  NEURO:  Alert and oriented x3, gait normal, coordination intact, CN II-XII grossly intact  PSYCH:  Mood and affect congruent  Vulva: Inspection of her external genitalia reveals normal mons pubis, labia minora and labia majora.  Normal appearing clitoris, urethral meatus.     Vagina:  Speculum exam reveals pink and moist  vaginal mucosa.  Bartholin gland is normal to palpation.  Cervix:  Cervix is normal in appearance with no lesions.  There is no cervical motion tenderness.  Perineum:  Perineum appears normal.   Rectal exam: external hemorrhoid visualized, mild thrombosis present, essentially non-tender.        ASSESSMENT/PLAN:  Maria R was seen today for lesion, vaginal pain and vaginal itching.    Diagnoses and all orders for this visit:    DM (diabetes mellitus) type II controlled peripheral vascular disorder (CMS/HCC): Increase Lantus as below, requested patient call the office with log of blood sugars following one month   -     insulin glargine (LANTUS SOLOSTAR) 100 UNIT/ML pen-injector; Inject 20 Units into the skin nightly.  -     GLYCOHEMOGLOBIN; Future    Screening for cervical cancer  -     THINPREP PAP TEST WITH HPV REFLEX    External hemorrhoid: Discussed supportive measures and continued monitoring of symptoms, at this point patient prefers to continue with home Preparation H treatment as she has been using      Yeast vaginitis: Itching onset following administration of antibiotics, additionally reviewed role of elevated blood sugars and yeast infections  -     fluconazole (DIFLUCAN) 150 MG tablet; Take 1 tablet by mouth once for 1 dose       FOLLOW UP:  Return in about 3 months (around 10/13/2017) for DM II.      push off from the bed/fair minus

## 2023-01-13 NOTE — HOSPICE CARE NOTE - CONVESATION DETAILS
At the request of CM Ally follow up call placed to patient's daughter Quoc and her spouse Donavan to further review the role of hospice at home versus in a facility. After further review, family has requested placement at [facility] with a transition residential level of care at the facility. Family declines hospice services as they are seeking further treatment for the patient's medical condition at this time.  Should family decide to keep the patient in [facility] placement is required.

## 2023-01-14 PROCEDURE — 99232 SBSQ HOSP IP/OBS MODERATE 35: CPT

## 2023-01-14 RX ORDER — APIXABAN 2.5 MG/1
10 TABLET, FILM COATED ORAL ONCE
Refills: 0 | Status: COMPLETED | OUTPATIENT
Start: 2023-01-14 | End: 2023-01-14

## 2023-01-14 RX ADMIN — PANTOPRAZOLE SODIUM 40 MILLIGRAM(S): 20 TABLET, DELAYED RELEASE ORAL at 05:27

## 2023-01-14 RX ADMIN — Medication 20 MILLIGRAM(S): at 05:28

## 2023-01-14 RX ADMIN — SENNA PLUS 2 TABLET(S): 8.6 TABLET ORAL at 21:29

## 2023-01-14 RX ADMIN — Medication 20 MILLIGRAM(S): at 18:56

## 2023-01-14 RX ADMIN — CHLORHEXIDINE GLUCONATE 1 APPLICATION(S): 213 SOLUTION TOPICAL at 05:29

## 2023-01-14 RX ADMIN — LOSARTAN POTASSIUM 50 MILLIGRAM(S): 100 TABLET, FILM COATED ORAL at 05:27

## 2023-01-14 RX ADMIN — ATORVASTATIN CALCIUM 10 MILLIGRAM(S): 80 TABLET, FILM COATED ORAL at 21:29

## 2023-01-14 RX ADMIN — MEMANTINE HYDROCHLORIDE 10 MILLIGRAM(S): 10 TABLET ORAL at 05:28

## 2023-01-14 RX ADMIN — APIXABAN 10 MILLIGRAM(S): 2.5 TABLET, FILM COATED ORAL at 05:28

## 2023-01-14 RX ADMIN — APIXABAN 10 MILLIGRAM(S): 2.5 TABLET, FILM COATED ORAL at 18:51

## 2023-01-14 RX ADMIN — MEMANTINE HYDROCHLORIDE 10 MILLIGRAM(S): 10 TABLET ORAL at 18:52

## 2023-01-14 RX ADMIN — Medication 2 GRAM(S): at 05:29

## 2023-01-14 RX ADMIN — Medication 2 GRAM(S): at 18:00

## 2023-01-15 PROCEDURE — 99232 SBSQ HOSP IP/OBS MODERATE 35: CPT

## 2023-01-15 RX ADMIN — LOSARTAN POTASSIUM 50 MILLIGRAM(S): 100 TABLET, FILM COATED ORAL at 05:32

## 2023-01-15 RX ADMIN — MEMANTINE HYDROCHLORIDE 10 MILLIGRAM(S): 10 TABLET ORAL at 05:32

## 2023-01-15 RX ADMIN — ATORVASTATIN CALCIUM 10 MILLIGRAM(S): 80 TABLET, FILM COATED ORAL at 21:28

## 2023-01-15 RX ADMIN — Medication 20 MILLIGRAM(S): at 18:20

## 2023-01-15 RX ADMIN — MEMANTINE HYDROCHLORIDE 10 MILLIGRAM(S): 10 TABLET ORAL at 18:21

## 2023-01-15 RX ADMIN — Medication 2 GRAM(S): at 05:35

## 2023-01-15 RX ADMIN — SENNA PLUS 2 TABLET(S): 8.6 TABLET ORAL at 21:28

## 2023-01-15 RX ADMIN — Medication 2 GRAM(S): at 18:20

## 2023-01-15 RX ADMIN — Medication 20 MILLIGRAM(S): at 05:34

## 2023-01-15 RX ADMIN — PANTOPRAZOLE SODIUM 40 MILLIGRAM(S): 20 TABLET, DELAYED RELEASE ORAL at 05:32

## 2023-01-15 RX ADMIN — CHLORHEXIDINE GLUCONATE 1 APPLICATION(S): 213 SOLUTION TOPICAL at 05:31

## 2023-01-16 ENCOUNTER — TRANSCRIPTION ENCOUNTER (OUTPATIENT)
Age: 83
End: 2023-01-16

## 2023-01-16 VITALS
RESPIRATION RATE: 18 BRPM | TEMPERATURE: 96 F | HEART RATE: 83 BPM | DIASTOLIC BLOOD PRESSURE: 58 MMHG | SYSTOLIC BLOOD PRESSURE: 110 MMHG

## 2023-01-16 LAB
ANION GAP SERPL CALC-SCNC: 11 MMOL/L — SIGNIFICANT CHANGE UP (ref 7–14)
APTT BLD: 28.5 SEC — SIGNIFICANT CHANGE UP (ref 27–39.2)
BUN SERPL-MCNC: 44 MG/DL — HIGH (ref 10–20)
CALCIUM SERPL-MCNC: 8.2 MG/DL — LOW (ref 8.4–10.5)
CHLORIDE SERPL-SCNC: 104 MMOL/L — SIGNIFICANT CHANGE UP (ref 98–110)
CO2 SERPL-SCNC: 22 MMOL/L — SIGNIFICANT CHANGE UP (ref 17–32)
CREAT SERPL-MCNC: 1.1 MG/DL — SIGNIFICANT CHANGE UP (ref 0.7–1.5)
EGFR: 50 ML/MIN/1.73M2 — LOW
GLUCOSE SERPL-MCNC: 135 MG/DL — HIGH (ref 70–99)
HCT VFR BLD CALC: 26 % — LOW (ref 37–47)
HGB BLD-MCNC: 8.6 G/DL — LOW (ref 12–16)
INR BLD: 1.1 RATIO — SIGNIFICANT CHANGE UP (ref 0.65–1.3)
MCHC RBC-ENTMCNC: 32.3 PG — HIGH (ref 27–31)
MCHC RBC-ENTMCNC: 33.1 G/DL — SIGNIFICANT CHANGE UP (ref 32–37)
MCV RBC AUTO: 97.7 FL — SIGNIFICANT CHANGE UP (ref 81–99)
NRBC # BLD: 0 /100 WBCS — SIGNIFICANT CHANGE UP (ref 0–0)
PLATELET # BLD AUTO: 256 K/UL — SIGNIFICANT CHANGE UP (ref 130–400)
POTASSIUM SERPL-MCNC: 4.1 MMOL/L — SIGNIFICANT CHANGE UP (ref 3.5–5)
POTASSIUM SERPL-SCNC: 4.1 MMOL/L — SIGNIFICANT CHANGE UP (ref 3.5–5)
PROTHROM AB SERPL-ACNC: 12.6 SEC — SIGNIFICANT CHANGE UP (ref 9.95–12.87)
RBC # BLD: 2.66 M/UL — LOW (ref 4.2–5.4)
RBC # FLD: 15.5 % — HIGH (ref 11.5–14.5)
SARS-COV-2 RNA SPEC QL NAA+PROBE: SIGNIFICANT CHANGE UP
SODIUM SERPL-SCNC: 137 MMOL/L — SIGNIFICANT CHANGE UP (ref 135–146)
WBC # BLD: 8.36 K/UL — SIGNIFICANT CHANGE UP (ref 4.8–10.8)
WBC # FLD AUTO: 8.36 K/UL — SIGNIFICANT CHANGE UP (ref 4.8–10.8)

## 2023-01-16 PROCEDURE — 99239 HOSP IP/OBS DSCHRG MGMT >30: CPT

## 2023-01-16 RX ORDER — FUROSEMIDE 40 MG
1 TABLET ORAL
Qty: 60 | Refills: 0
Start: 2023-01-16 | End: 2023-02-14

## 2023-01-16 RX ORDER — APIXABAN 2.5 MG/1
1 TABLET, FILM COATED ORAL
Qty: 60 | Refills: 0
Start: 2023-01-16 | End: 2023-02-14

## 2023-01-16 RX ORDER — POLYETHYLENE GLYCOL 3350 17 G/17G
17 POWDER, FOR SOLUTION ORAL
Qty: 0 | Refills: 0 | DISCHARGE
Start: 2023-01-16

## 2023-01-16 RX ORDER — OXYCODONE HYDROCHLORIDE 5 MG/1
1 TABLET ORAL
Qty: 20 | Refills: 0
Start: 2023-01-16 | End: 2023-01-20

## 2023-01-16 RX ORDER — SENNA PLUS 8.6 MG/1
2 TABLET ORAL
Qty: 0 | Refills: 0 | DISCHARGE
Start: 2023-01-16

## 2023-01-16 RX ADMIN — PANTOPRAZOLE SODIUM 40 MILLIGRAM(S): 20 TABLET, DELAYED RELEASE ORAL at 05:10

## 2023-01-16 RX ADMIN — Medication 2 GRAM(S): at 05:09

## 2023-01-16 RX ADMIN — CHLORHEXIDINE GLUCONATE 1 APPLICATION(S): 213 SOLUTION TOPICAL at 05:09

## 2023-01-16 RX ADMIN — LOSARTAN POTASSIUM 50 MILLIGRAM(S): 100 TABLET, FILM COATED ORAL at 05:12

## 2023-01-16 RX ADMIN — Medication 20 MILLIGRAM(S): at 17:29

## 2023-01-16 RX ADMIN — Medication 2 GRAM(S): at 17:29

## 2023-01-16 RX ADMIN — Medication 20 MILLIGRAM(S): at 05:10

## 2023-01-16 RX ADMIN — MEMANTINE HYDROCHLORIDE 10 MILLIGRAM(S): 10 TABLET ORAL at 17:29

## 2023-01-16 RX ADMIN — MEMANTINE HYDROCHLORIDE 10 MILLIGRAM(S): 10 TABLET ORAL at 05:10

## 2023-01-16 NOTE — CHART NOTE - NSCHARTNOTEFT_GEN_A_CORE
GI recommended IR guided liver bx. IR was consulted; spoke to IR attending who recommends outpatient IR guided biopsy.  -spoke to family/HCP and discussed with them that outpatient IR guided bx was recommended
visited patient earlier today. patient encountered resting in bed; sleeping; no non verbal signs of pain or distress. His daughter at bedside. She discussed not wanting to go with hospice at the moment. Her sister is arriving from [place] soon who has not seen her parents in 4 years due to covid. They are hopeful that patient will be able to make it to [place]. support rendered. will follow. [phone]
PALLIATIVE MEDICINE INTERDISCIPLINARY TEAM NOTE    Provider:  [   ]Social Work   [   ]          [   ] Initial visit [   ] Follow up    Family or contact name / phone #   Met with: [   ] Patient  [   ] Family  [   ] Other:    Primary Language: [   ] English [   ] Other*:                      *Interpretation provided by:    SUPPORT DIAGNOSES            (Check all that apply)  [   ] Psychosocial spiritual assessment (PSSA)  [   ] EOL issues  [   ] Cultural / spiritual concerns  [   ] Pain / suffering  [   ] Dementia / AMS  [   ] Other:  [   ] AD issues  [   ] Grief / loss / sadness  [   ] Discharge issues  [   ] Distress / coping    PSYCHOSOCIAL ASSESSMENT OF PATIENT         (Check all that apply)  [   ] Initial Assessment            [   ] Reassessment          [   ] Not Applicable this visit    Pain/suffering acuity:  [   ] None to mild (0-3)           [   ] Moderate (4-6)        [   ] High (7-10)    Mental Status:  [   ] Alert/oriented (x3)          [   ] Confused/Altered(x2/x1)         [   ] Non-resp    Functional status:  [   ] Independent w ADLs      [   ] Needs Assistance             [   ] Bedbound/Full Care    Coping:  [   ] Coping well                     [   ] Coping w/difficulty            [   ] Poor coping    Support system:  [   ] Strong                              [   ] Adequate                        [   ] Inadequate    SPIRITUAL ASSESSMENT  Episcopalian/Spiritual practice: ____Catholic_______________________    Role of organized Religious:  [ x  ] Important                     [   ] Some (fam tradition, cultural)              [   ] None    Effects on medical care:  [   ] Yes, _____________________________________                         [ x ] None    Cultural/Hinduism need:  [   ] Yes, _____________________________________                         [ x  ] None    Refer to Pastoral Care:  [   ] Yes           [ x ] No, not at this time    SERVICE PROVIDED  [   ]PSSA                                                                      [   ]Discharge support / facilitation  [   ]AD / goals of care counseling                                  [   ]EOL / death / bereavement counseling  [   ]Counseling / support                                              [   ] Family meeting  [ x ]Prayer / sacrament / ritual                                      [   ] Referral   [   ]Other                                                                       NOTE and Plan of Care (PoC): Pts. are  and wife. They look comfortable. family members were present and caring for Pts, they are realistic and working of finding placement for them.
detailed exam

## 2023-01-16 NOTE — DISCHARGE NOTE NURSING/CASE MANAGEMENT/SOCIAL WORK - PATIENT PORTAL LINK FT
You can access the FollowMyHealth Patient Portal offered by [facility] by registering at the following website: [email or web address]. By joining [facility]’s FollowMyHealth portal, you will also be able to view your health information using other applications (apps) compatible with our system.

## 2023-01-16 NOTE — DISCHARGE NOTE PROVIDER - NSDCCPCAREPLAN_GEN_ALL_CORE_FT
PRINCIPAL DISCHARGE DIAGNOSIS  Diagnosis: Gallbladder neoplasm  Assessment and Plan of Treatment: Patient was admitted for pain control and lower extremity swelling. 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 Patient's CT abd: Gallbladder with diffuse asymmetric wall thickening up to 1.4 cm, consistent with given history of gallbladder carcinoma. Diffuse hepatic metastases, retroperitoneal and periportal lymphadenopathy. Small pelvic ascites. Small left pleural effusion. Palliative care and  hospice were consulted. GI and IR also evaluated the patient. Oncology was also consulted. Arrangements were made for outpatient follow up with interventional radiology for liver biopsy and outpatient follow up with oncologist. At this time patient is medically stable for a hospital discharge.  Things to follow up:  -PCP follow up in 1 week  -Follow up with interventional radiologist on 1/18/2023 for liver biopsy  - Instruction for patient upon dc:  > your procedure is: liver biopsy, scheduled for Wednesday 1/18 in Interventional Radiology  >do not eat or drink after midnight on Tuesday 1/17  >please hold your anticoagulation Eliquis for 3 days (6 doses)  >You will report to [address] [place] [address] on the morning of your procedure  >You will receive phone call 24hr prior to procedure with procedure arrival time and additional instructions  > if any questions regarding your procedure please call: [phone]  -Follow up with oncologist Jose Sanchez) after liver biopsy, patient will need follow-up as outpatient depending on all the ancillary studies and her performance status they will decide on treatment plan  -After the biopsy start taking eliquis 5mg twice a day  -Take lasix 20mg twice a day and elevate lower extremity      SECONDARY DISCHARGE DIAGNOSES  Diagnosis: Liver metastasis  Assessment and Plan of Treatment:     Diagnosis: Pulmonary embolism  Assessment and Plan of Treatment: Patient was also found to have pulmonary embolism in right lung; CT PE w/u: Pulmonary embolism in the right lower lobe pulmonary artery with extension into multiple segmental and subsegmental branches. Patient was started on Eliquis. Patient was also found to have LE Edema with Failed outpatient Lasix; patient was started on lasix 20mg twice a day. LE duplex showed no DVT.   Things to follow up:  -PCP follow up in 1 week  -After the biopsy start taking eliquis 5mg twice a day  -Take lasix 20mg twice a day and elevate lower extremity    Diagnosis: Admission for palliative care  Assessment and Plan of Treatment:

## 2023-01-16 NOTE — PROGRESS NOTE ADULT - ASSESSMENT
82 year old Female with past medical history of GB cancer with Liver mets, breast cancer s/p mastectomy, HTN, HLD, was brought into the ER by child stating her leg swelling is not improving with outpatient Lasix and uncontrollable abdominal pain.     # GB cancer with Liver mets   # Hx of Breast cancer s/p mastectomy   # Abdominal pain 2/2 malignancy  # Goals of care  - CT abd: Gallbladder with diffuse asymmetric wall thickening up to 1.4 cm, consistent with given history of gallbladder carcinoma. Diffuse hepatic metastases, retroperitoneal and periportal lymphadenopathy. Small pelvic ascites. Small left pleural effusion.  - patient was scheduled for  EGD with EUS 1/12/2023 for today as outpatient  - hospice was recommended for patient by outpatient oncologist  PLAN  - initially was admitted for palliative care and hospice eval; after further discussion with family, they would now like second opinion from oncology and possible biopsy and possible palliative treatment if offered  - daughter/HCP would like oncology eval for second opinion to see if there is any possible palliative treatment options that's offered   - daughter spoke with her sisters in [place] and now decided to proceed with EGD/EUS with bx now  - palliative care consult and hospice eval  - Analgesia PRN   - MOLST Form filled     # PE in right lung  - CT PE w/u: Pulmonary embolism in the right lower lobe pulmonary artery with extension into multiple segmental and subsegmental branches.  PLAN  - c/w lovenox  - if no plans for procedure (biopsy), will switch to Eliquis    # LE Edema with Failed outpatient Lasix   - PO Lasix 20   - f/u LE duplex    # Essential Hypertension    - Monitor Vital Signs    - DASH diet   - Continue with home medications     # Mixed Hyperlipidemia  - DASH diet   - Continue with home medications     Progress Note Handoff  Pending Consults: oncology, GI, palliative care, hospice  Pending Tests: possible further w/u of gallbladder ca  Pending Results: clinical improvement   Family Discussion: Daughter [phone]; son-in-law [phone]  Disposition: Home_____/SNF______/Other_____/Unknown at this time__X___  Spent over 50 min reviewing chart, speaking with patient/family and on coordinating patient care during interdisciplinary rounds
82 year old Female with past medical history of GB cancer with Liver mets, breast cancer s/p mastectomy, HTN, HLD, was brought into the ER by child stating her leg swelling is not improving with outpatient Lasix and uncontrollable abdominal pain.     # Gallbladder carcinoma with hepatic mets  # Hx of Breast cancer s/p mastectomy   # Abdominal pain 2/2 malignancy  # Goals of care  - CT abd: Gallbladder with diffuse asymmetric wall thickening up to 1.4 cm, consistent with given history of gallbladder carcinoma. Diffuse hepatic metastases, retroperitoneal and periportal lymphadenopathy. Small pelvic ascites. Small left pleural effusion.  - patient was scheduled for  EGD with EUS 1/12/2023 for today as outpatient  - hospice was recommended for patient by outpatient oncologist  PLAN  - GI was consulted for EGD/EUS/bx, thought to be not safe especially with IR bx of liver is an option  - IR consulted: recommended outpatient biopsy  - plan for outpatient liver biopsy on Wednesday 1/18 with IR; if patient will inpatient, will ask IR to proceed with the procedure as intpatient  - f/u CBC, CMP, PT/INR and COVID swab  - Eliquis on hold  - Instruction for patient upon dc:  > your procedure is: liver biopsy, scheduled for Wednesday 1/18 in Interventional Radiology  >do not eat or drink after midnight on Tuesday 1/17  >please hold your anticoagulation Eliquis for 3 days (6 doses)  >You will report to [address] [place] [address] Entrance on the morning of your procedure  >You will receive phone call 24hr prior to procedure with procedure arrival time and additional instructions  > if any questions regarding your procedure please call: [phone]  - Oncology eval appreciated; after IR guided liver biopsy, patient will need follow-up as outpatient depending on all the ancillary studies and her performance status they will decide on treatment plan  - palliative care consult and hospice eval  - Analgesia PRN   - MOLST Form filled     # PE in right lung  - CT PE w/u: Pulmonary embolism in the right lower lobe pulmonary artery with extension into multiple segmental and subsegmental branches.  PLAN  - Eliquis on hold    # LE Edema with Failed outpatient Lasix   - LE duplex: no DVT  - PO Lasix 20 and elevation of LE    # Essential Hypertension    - Monitor Vital Signs    - DASH diet   - Continue with home medications     # Mixed Hyperlipidemia  - DASH diet   - Continue with home medications     Progress Note Handoff  Pending Consults: none  Pending Tests: none  Pending Results: clinical improvement   Family Discussion: Daughter [phone]; son-in-law [phone]  Disposition: Home_____/SNF__Xdc planning home, medically dc ready___/Other_____/Unknown at this time_____  Spent over 50 min reviewing chart, speaking with patient/family and on coordinating patient care during interdisciplinary rounds
82 year old Female with past medical history of GB cancer with Liver mets, breast cancer s/p mastectomy, HTN, HLD, was brought into the ER by child stating her leg swelling is not improving with outpatient Lasix and uncontrollable abdominal pain.     #Gallbladder carcinoma with hepatic mets  # Hx of Breast cancer s/p mastectomy   # Abdominal pain 2/2 malignancy  # Goals of care  - CT abd: Gallbladder with diffuse asymmetric wall thickening up to 1.4 cm, consistent with given history of gallbladder carcinoma. Diffuse hepatic metastases, retroperitoneal and periportal lymphadenopathy. Small pelvic ascites. Small left pleural effusion.  - patient was scheduled for  EGD with EUS 1/12/2023 for today as outpatient  - hospice was recommended for patient by outpatient oncologist  PLAN  - GI was consulted for EGD/EUS/bx, thought to be not safe especially with IR bx of liver is an option  - IR consulted: recommended outpatient biopsy  - plan for outpatient liver biopsy on Wednesday 1/18 with IR; draw CBC, CMP, PT/INR prior to discharge and new COVID swab needed prior to discharge   - Instruction for patient upon dc:  > your procedure is: liver biopsy, scheduled for Wednesday 1/18 in Interventional Radiology  >do not eat or drink after midnight on Tuesday 1/17  >please hold your anticoagulation Eliquis for 3 days (6 doses)  >You will report to [address] [place] [address] Entrance on the morning of your procedure  >You will receive phone call 24hr prior to procedure with procedure arrival time and additional instructions  > if any questions regarding your procedure please call: [phone]  - Oncology eval appreciated; after IR guided liver biopsy, patient will need follow-up as outpatient depending on all the ancillary studies and her performance status they will decide on treatment plan  - palliative care consult and hospice eval  - Analgesia PRN   - MOLST Form filled     # PE in right lung  - CT PE w/u: Pulmonary embolism in the right lower lobe pulmonary artery with extension into multiple segmental and subsegmental branches.  PLAN  - will switch to Eliquis    # LE Edema with Failed outpatient Lasix   - LE duplex: no DVT  - PO Lasix 20 and elevation of LE    # Essential Hypertension    - Monitor Vital Signs    - DASH diet   - Continue with home medications     # Mixed Hyperlipidemia  - DASH diet   - Continue with home medications     Progress Note Handoff  Pending Consults: none  Pending Tests: none  Pending Results: clinical improvement   Family Discussion: Daughter [phone]; son-in-law [phone]  Disposition: Home_____/SNF__Xdc planning to SNF, medically dc ready____/Other_____/Unknown at this time_____  Spent over 50 min reviewing chart, speaking with patient/family and on coordinating patient care during interdisciplinary rounds
82 year old Female with past medical history of GB cancer with Liver mets, breast cancer s/p mastectomy, HTN, HLD, was brought into the ER by child stating her leg swelling is not improving with outpatient Lasix and uncontrollable abdominal pain.     #Gallbladder carcinoma with hepatic mets  # Hx of Breast cancer s/p mastectomy   # Abdominal pain 2/2 malignancy  # Goals of care  - CT abd: Gallbladder with diffuse asymmetric wall thickening up to 1.4 cm, consistent with given history of gallbladder carcinoma. Diffuse hepatic metastases, retroperitoneal and periportal lymphadenopathy. Small pelvic ascites. Small left pleural effusion.  - patient was scheduled for  EGD with EUS 1/12/2023 for today as outpatient  - hospice was recommended for patient by outpatient oncologist  PLAN  - GI was consulted for EGD/EUS/bx, thought to be not safe especially with IR bx of liver is an option  - IR consulted: recommended outpatient biopsy  - plan for outpatient liver biopsy on Wednesday 1/18 with IR; if patient will inpatient, will ask IR to proceed with the procedure as intpatient  - will need CBC, CMP, PT/INR prior to discharge and new COVID swab needed prior to discharge   - Eliquis on hold  - Instruction for patient upon dc:  > your procedure is: liver biopsy, scheduled for Wednesday 1/18 in Interventional Radiology  >do not eat or drink after midnight on Tuesday 1/17  >please hold your anticoagulation Eliquis for 3 days (6 doses)  >You will report to [address] [place] [address] Entrance on the morning of your procedure  >You will receive phone call 24hr prior to procedure with procedure arrival time and additional instructions  > if any questions regarding your procedure please call: [phone]  - Oncology eval appreciated; after IR guided liver biopsy, patient will need follow-up as outpatient depending on all the ancillary studies and her performance status they will decide on treatment plan  - palliative care consult and hospice eval  - Analgesia PRN   - MOLST Form filled     # PE in right lung  - CT PE w/u: Pulmonary embolism in the right lower lobe pulmonary artery with extension into multiple segmental and subsegmental branches.  PLAN  - Eliquis on hold    # LE Edema with Failed outpatient Lasix   - LE duplex: no DVT  - PO Lasix 20 and elevation of LE    # Essential Hypertension    - Monitor Vital Signs    - DASH diet   - Continue with home medications     # Mixed Hyperlipidemia  - DASH diet   - Continue with home medications     Progress Note Handoff  Pending Consults: none  Pending Tests: none  Pending Results: clinical improvement   Family Discussion: Daughter [phone]; son-in-law [phone]  Disposition: Home_____/SNF__Xdc planning to SNF, medically dc ready____/Other_____/Unknown at this time_____  Spent over 50 min reviewing chart, speaking with patient/family and on coordinating patient care during interdisciplinary rounds
82 year old Female with past medical history of GB cancer with Liver mets, breast cancer s/p mastectomy, HTN, HLD, was brought into the ER by child stating her leg swelling is not improving with outpatient Lasix and uncontrollable abdominal pain.     #Gallbladder carcinoma with hepatic mets  # Hx of Breast cancer s/p mastectomy   # Abdominal pain 2/2 malignancy  # Goals of care  - CT abd: Gallbladder with diffuse asymmetric wall thickening up to 1.4 cm, consistent with given history of gallbladder carcinoma. Diffuse hepatic metastases, retroperitoneal and periportal lymphadenopathy. Small pelvic ascites. Small left pleural effusion.  - patient was scheduled for  EGD with EUS 1/12/2023 for today as outpatient  - hospice was recommended for patient by outpatient oncologist  PLAN  - GI was consulted for EGD/EUS/bx, thought to be not safe especially with IR bx of liver is an option  - IR consulted: recommended outpatient biopsy  - plan for outpatient liver biopsy on Wednesday 1/18 with IR; draw CBC, CMP, PT/INR prior to discharge and new COVID swab needed prior to discharge   - Instruction for patient upon dc:  > your procedure is: liver biopsy, scheduled for Wednesday 1/18 in Interventional Radiology  >do not eat or drink after midnight on Tuesday 1/17  >please hold your anticoagulation Eliquis for 3 days (6 doses)  >You will report to [address] [place] [address] Entrance on the morning of your procedure  >You will receive phone call 24hr prior to procedure with procedure arrival time and additional instructions  > if any questions regarding your procedure please call: [phone]  - Oncology eval appreciated; after IR guided liver biopsy, patient will need follow-up as outpatient depending on all the ancillary studies and her performance status they will decide on treatment plan  - palliative care consult and hospice eval  - Analgesia PRN   - MOLST Form filled     # PE in right lung  - CT PE w/u: Pulmonary embolism in the right lower lobe pulmonary artery with extension into multiple segmental and subsegmental branches.  PLAN  - will switch to Eliquis    # LE Edema with Failed outpatient Lasix   - LE duplex: no DVT  - PO Lasix 20 and elevation of LE    # Essential Hypertension    - Monitor Vital Signs    - DASH diet   - Continue with home medications     # Mixed Hyperlipidemia  - DASH diet   - Continue with home medications     Progress Note Handoff  Pending Consults: none  Pending Tests: none  Pending Results: clinical improvement   Family Discussion: Daughter [phone]; son-in-law [phone]  Disposition: Home_____/SNF__Xdc planning to SNF, medically dc ready____/Other_____/Unknown at this time_____  Spent over 50 min reviewing chart, speaking with patient/family and on coordinating patient care during interdisciplinary rounds

## 2023-01-16 NOTE — DISCHARGE NOTE PROVIDER - HOSPITAL COURSE
82 year old Female with past medical history of GB cancer with Liver mets, breast cancer s/p mastectomy, HTN, HLD, was brought into the ER by child stating her leg swelling is not improving with outpatient Lasix and uncontrollable abdominal pain. Patient was admitted for pain control and lower extremity swelling. 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 Patient's CT abd: Gallbladder with diffuse asymmetric wall thickening up to 1.4 cm, consistent with given history of gallbladder carcinoma. Diffuse hepatic metastases, retroperitoneal and periportal lymphadenopathy. Small pelvic ascites. Small left pleural effusion. Patient was also found to have pulmonary embolism in right lung; CT PE w/u: Pulmonary embolism in the right lower lobe pulmonary artery with extension into multiple segmental and subsegmental branches. Patient was started on Eliquis. Patient was also found to have LE Edema with Failed outpatient Lasix; patient was started on lasix 20mg twice a day. LE duplex showed no DVT. Palliative care and  hospice were consulted. GI and IR also evaluated the patient. Oncology was also consulted. Arrangements were made for outpatient follow up with interventional radiology for liver biopsy and outpatient follow up with oncologist. At this time patient is medically stable for a hospital discharge.    Things to follow up:  -PCP follow up in 1 week  -Follow up with interventional radiologist on 1/18/2023 for liver biopsy  - Instruction for patient upon dc:  > your procedure is: liver biopsy, scheduled for Wednesday 1/18 in Interventional Radiology  >do not eat or drink after midnight on Tuesday 1/17  >please hold your anticoagulation Eliquis for 3 days (6 doses)  >You will report to [address] [place] [address] Entrance on the morning of your procedure  >You will receive phone call 24hr prior to procedure with procedure arrival time and additional instructions  > if any questions regarding your procedure please call: [phone]  -Follow up with oncologist Jose Sanchez) after liver biopsy, patient will need follow-up as outpatient depending on all the ancillary studies and her performance status they will decide on treatment plan  -After the biopsy start taking eliquis 5mg twice a day  -Take lasix 20mg twice a day and elevate lower extremity

## 2023-01-16 NOTE — PROGRESS NOTE ADULT - SUBJECTIVE AND OBJECTIVE BOX
MICAELA FREDDYLETICIA  82y  Female      Patient is a 82y old  Female who presents with a chief complaint of LE edema (11 Jan 2023 18:13)      INTERVAL HPI/OVERNIGHT EVENTS:  Patient seen and examined earlier this morning; patient's daughter at bedside; patient appears comfortable and denies any complaints. Spoke to patient's daughter and son-in-law regarding for plan of care. DIscussed plan of care with them at bedside.      REVIEW OF SYSTEMS:  CONSTITUTIONAL: No fever, weight loss, or fatigue  EYES: No eye pain, visual disturbances, or discharge  ENMT:  No difficulty hearing, tinnitus, vertigo; No sinus or throat pain  NECK: No pain or stiffness  RESPIRATORY: No cough, wheezing, chills or hemoptysis; No shortness of breath  CARDIOVASCULAR: No chest pain, palpitations, dizziness, or leg swelling  GASTROINTESTINAL: No abdominal or epigastric pain. No nausea, vomiting, or hematemesis; No diarrhea or constipation.  GENITOURINARY: No dysuria, frequency, hematuria, or incontinence  NEUROLOGICAL: No headaches, memory loss, loss of strength, numbness, or tremors  MUSCULOSKELETAL: No joint pain or swelling; No muscle, back, or extremity pain, +edema in b/l LE      Vital Signs Last 24 Hrs  T(C): 35.8 (14 Jan 2023 14:06), Max: 37.4 (13 Jan 2023 20:32)  T(F): 96.4 (14 Jan 2023 14:06), Max: 99.3 (13 Jan 2023 20:32)  HR: 78 (14 Jan 2023 14:06) (76 - 93)  BP: 109/52 (14 Jan 2023 14:06) (109/52 - 128/60)  BP(mean): --  RR: 18 (14 Jan 2023 14:06) (18 - 18)  SpO2: 95% (13 Jan 2023 20:32) (95% - 95%)      PHYSICAL EXAM:  GENERAL: NAD, well-groomed, well-developed  HEAD:  Atraumatic, Normocephalic  EYES: EOMI, PERRLA, conjunctiva and sclera clear  ENMT: No tonsillar erythema, exudates, or enlargement; Moist mucous membranes  NECK: Supple, No JVD, Normal thyroid  NERVOUS SYSTEM:  Alert & Oriented X3, Good concentration; Motor Strength 5/5 B/L upper and lower extremities  CHEST/LUNG: Clear to percussion bilaterally; No rales, rhonchi, wheezing, or rubs  HEART: Regular rate and rhythm; No murmurs, rubs, or gallops  ABDOMEN: Soft, Nontender, Nondistended; Bowel sounds present  EXTREMITIES:  2+ Peripheral Pulses, No clubbing, cyanosis, +b/l LE edema      Consultant(s) Notes Reviewed:  [x ] YES  [ ] NO  Care Discussed with Consultants/Other Providers [ x] YES  [ ] NO    LAB:                        8.5    7.81  )-----------( 213      ( 13 Jan 2023 06:32 )             25.8   01-13    140  |  105  |  29<H>  ----------------------------<  109<H>  4.4   |  26  |  1.1    Ca    8.5      13 Jan 2023 06:32    TPro  5.1<L>  /  Alb  2.8<L>  /  TBili  1.0  /  DBili  x   /  AST  94<H>  /  ALT  49<H>  /  AlkPhos  438<H>  01-13          Drug Dosing Weight  Height (cm): 160 (11 Jan 2023 18:00)  Weight (kg): 54.5 (11 Jan 2023 18:00)  BMI (kg/m2): 21.3 (11 Jan 2023 18:00)  BSA (m2): 1.56 (11 Jan 2023 18:00)  Height (cm): 160 (01-11-23 @ 18:00)  Weight (kg): 54.5 (01-11-23 @ 18:00)  BMI (kg/m2): 21.3 (01-11-23 @ 18:00)  BSA (m2): 1.56 (01-11-23 @ 18:00)  CAPILLARY BLOOD GLUCOSE        I&O's Summary        RADIOLOGY & ADDITIONAL TESTS:  Imaging Personally Reviewed:  [x] YES  [ ] NO    HEALTH ISSUES - PROBLEM Dx:          MEDICATIONS  (STANDING):  apixaban 10 milliGRAM(s) Oral every 12 hours  atorvastatin 10 milliGRAM(s) Oral at bedtime  chlorhexidine 4% Liquid 1 Application(s) Topical <User Schedule>  furosemide    Tablet 20 milliGRAM(s) Oral every 12 hours  influenza  Vaccine (HIGH DOSE) 0.7 milliLiter(s) IntraMuscular once  losartan 50 milliGRAM(s) Oral daily  memantine 10 milliGRAM(s) Oral two times a day  naloxone Injectable 0.4 milliGRAM(s) IV Push once  omega-3-Acid Ethyl Esters 2 Gram(s) Oral two times a day  pantoprazole    Tablet 40 milliGRAM(s) Oral before breakfast  polyethylene glycol 3350 17 Gram(s) Oral daily  senna 2 Tablet(s) Oral at bedtime    MEDICATIONS  (PRN):  acetaminophen     Tablet .. 650 milliGRAM(s) Oral every 6 hours PRN Temp greater or equal to 38C (100.4F), Mild Pain (1 - 3)  aluminum hydroxide/magnesium hydroxide/simethicone Suspension 30 milliLiter(s) Oral every 4 hours PRN Dyspepsia  bisacodyl 5 milliGRAM(s) Oral daily PRN Constipation  meclizine 25 milliGRAM(s) Oral daily PRN Dizziness  melatonin 3 milliGRAM(s) Oral at bedtime PRN Insomnia  morphine  - Injectable 2 milliGRAM(s) IV Push every 4 hours PRN Moderate Pain (4 - 6)  morphine  - Injectable 4 milliGRAM(s) IV Push every 4 hours PRN Severe Pain (7 - 10)  ondansetron Injectable 4 milliGRAM(s) IV Push every 8 hours PRN Nausea and/or Vomiting  
Interventional Radiology Follow- Up Note      Procedure Requested: liver biopsy    HPI:  Patient is a 82 year old Female with past medical history of GB cancer with Liver mets, breast cancer s/p mastectomy, HTN, HLD, was brought into the ER by child stating her leg swelling is not improving with outpatient Lasix and uncontrollable abdominal pain. Morphine has improved the pain. Patient was scheduled to have an EGD with EUS tomorrow (1/12/2023). Son denies any fevers, chills, nausea, vomiting, chest pain, SOB, diarrhea, constipation, jaundice.  (11 Jan 2023 18:13)      Vitals: T(F): 97.4 (01-13-23 @ 05:00), Max: 97.9 (01-12-23 @ 20:22)  HR: 73 (01-13-23 @ 05:00) (73 - 77)  BP: 122/59 (01-13-23 @ 05:00) (122/59 - 138/63)  RR: 18 (01-13-23 @ 05:00) (18 - 18)  SpO2: 97% (01-12-23 @ 20:22) (97% - 97%)  Wt(kg): --    LABS:                        8.5    7.81  )-----------( 213      ( 13 Jan 2023 06:32 )             25.8     01-13    140  |  105  |  29<H>  ----------------------------<  109<H>  4.4   |  26  |  1.1    Ca    8.5      13 Jan 2023 06:32    TPro  5.1<L>  /  Alb  2.8<L>  /  TBili  1.0  /  DBili  x   /  AST  94<H>  /  ALT  49<H>  /  AlkPhos  438<H>  01-13    PT/INR - ( 11 Jan 2023 15:36 )   PT: 12.20 sec;   INR: 1.07 ratio         PTT - ( 11 Jan 2023 15:36 )  PTT:32.7 sec    Impression: 82y Female admitted with GALLBLADDER NEOPLASM; LIVER METASTASIS; PULMONARY EMBOLISM; ADMISSION FOR      ASSESSMENT/ PLAN:   Patient is a 82 year old Female with past medical history of GB cancer with Liver mets, breast cancer s/p mastectomy, HTN, HLD, was brought into the ER by child stating her leg swelling is not improving with outpatient Lasix and uncontrollable abdominal pain. Morphine has improved the pain. Patient was scheduled to have an EGD with EUS tomorrow (1/12/2023). Patients family was considering hospice care but would now like a second opinion from heme/onc and gallbladder biopsy for diagnosis. GI consulted for EUS, note stating EUS is dangerous and recommended IR for liver biopsy.  - plan for outpatient liver biopsy on Wednesday 1/18  - draw CBC, CMP, PT/INR prior to discharge  - new COVID swab needed prior to discharge   - patient on Lovenox 70mg, will be changed to Eliquis today prior to discharge   - please include the following in patients discharge instructions:  - your procedure is: liver biopsy, scheduled for Wednesday 1/18 in Interventional Radiology  - do not eat or drink after midnight on Tuesday 1/17  - please hold your anticoagulation Eliquis for 3 days (6 doses)  - You will report to [address] [place] [address] on the morning of your procedure  - You will receive phone call 24hr prior to procedure with procedure arrival time and additional instructions  - if any questions regarding your procedure please call: [phone]       Please call Interventional Radiology [phone] with any questions, concerns, or issues regarding above.     
  MICAELA BRYAN  82y  Female      Patient is a 82y old  Female who presents with a chief complaint of LE edema (11 Jan 2023 18:13)      INTERVAL HPI/OVERNIGHT EVENTS:  Patient seen and examined earlier this morning; patient's daughter at bedside; patient appears comfortable and denies any complaints. Daughter reports she spoke to her sisters in [place] and decided that she wants another opinion from oncologist and wants to get biopsy of the gallbladder.      REVIEW OF SYSTEMS:  CONSTITUTIONAL: No fever, weight loss, or fatigue  EYES: No eye pain, visual disturbances, or discharge  ENMT:  No difficulty hearing, tinnitus, vertigo; No sinus or throat pain  NECK: No pain or stiffness  RESPIRATORY: No cough, wheezing, chills or hemoptysis; No shortness of breath  CARDIOVASCULAR: No chest pain, palpitations, dizziness, or leg swelling  GASTROINTESTINAL: No abdominal or epigastric pain. No nausea, vomiting, or hematemesis; No diarrhea or constipation.  GENITOURINARY: No dysuria, frequency, hematuria, or incontinence  NEUROLOGICAL: No headaches, memory loss, loss of strength, numbness, or tremors  MUSCULOSKELETAL: No joint pain or swelling; No muscle, back, or extremity pain, +edema in b/l LE      T(C): 36.7 (01-12-23 @ 05:10), Max: 36.7 (01-12-23 @ 05:10)  HR: 84 (01-12-23 @ 05:10) (71 - 84)  BP: 142/60 (01-12-23 @ 05:10) (134/67 - 142/60)  RR: 18 (01-11-23 @ 15:09) (18 - 18)  SpO2: 97% (01-11-23 @ 15:09) (97% - 97%)    PHYSICAL EXAM:  GENERAL: NAD, well-groomed, well-developed  HEAD:  Atraumatic, Normocephalic  EYES: EOMI, PERRLA, conjunctiva and sclera clear  ENMT: No tonsillar erythema, exudates, or enlargement; Moist mucous membranes  NECK: Supple, No JVD, Normal thyroid  NERVOUS SYSTEM:  Alert & Oriented X3, Good concentration; Motor Strength 5/5 B/L upper and lower extremities  CHEST/LUNG: Clear to percussion bilaterally; No rales, rhonchi, wheezing, or rubs  HEART: Regular rate and rhythm; No murmurs, rubs, or gallops  ABDOMEN: Soft, Nontender, Nondistended; Bowel sounds present  EXTREMITIES:  2+ Peripheral Pulses, No clubbing, cyanosis, +b/l LE edema      Consultant(s) Notes Reviewed:  [x ] YES  [ ] NO  Care Discussed with Consultants/Other Providers [ x] YES  [ ] NO    LAB:                        8.1    6.71  )-----------( 207      ( 12 Jan 2023 06:45 )             24.4     01-11    136  |  102  |  42<H>  ----------------------------<  129<H>  4.5   |  23  |  1.0    Ca    8.7      11 Jan 2023 15:36    TPro  5.4<L>  /  Alb  3.3<L>  /  TBili  1.1  /  DBili  x   /  AST  100<H>  /  ALT  51<H>  /  AlkPhos  472<H>  01-11    LIVER FUNCTIONS - ( 11 Jan 2023 15:36 )  Alb: 3.3 g/dL / Pro: 5.4 g/dL / ALK PHOS: 472 U/L / ALT: 51 U/L / AST: 100 U/L / GGT: x           CARDIAC MARKERS ( 11 Jan 2023 17:41 )  x     / <0.01 ng/mL / x     / x     / x                  Drug Dosing Weight  Height (cm): 160 (11 Jan 2023 18:00)  Weight (kg): 54.5 (11 Jan 2023 18:00)  BMI (kg/m2): 21.3 (11 Jan 2023 18:00)  BSA (m2): 1.56 (11 Jan 2023 18:00)  Height (cm): 160 (01-11-23 @ 18:00)  Weight (kg): 54.5 (01-11-23 @ 18:00)  BMI (kg/m2): 21.3 (01-11-23 @ 18:00)  BSA (m2): 1.56 (01-11-23 @ 18:00)  CAPILLARY BLOOD GLUCOSE        I&O's Summary        RADIOLOGY & ADDITIONAL TESTS:  Imaging Personally Reviewed:  [x] YES  [ ] NO    HEALTH ISSUES - PROBLEM Dx:          MEDS:  acetaminophen     Tablet .. 650 milliGRAM(s) Oral every 6 hours PRN  aluminum hydroxide/magnesium hydroxide/simethicone Suspension 30 milliLiter(s) Oral every 4 hours PRN  atorvastatin 10 milliGRAM(s) Oral at bedtime  bisacodyl 5 milliGRAM(s) Oral daily PRN  chlorhexidine 4% Liquid 1 Application(s) Topical <User Schedule>  enoxaparin Injectable 70 milliGRAM(s) SubCutaneous every 12 hours  furosemide    Tablet 20 milliGRAM(s) Oral every 12 hours  influenza  Vaccine (HIGH DOSE) 0.7 milliLiter(s) IntraMuscular once  losartan 50 milliGRAM(s) Oral daily  meclizine 25 milliGRAM(s) Oral daily PRN  melatonin 3 milliGRAM(s) Oral at bedtime PRN  memantine 10 milliGRAM(s) Oral two times a day  morphine  - Injectable 2 milliGRAM(s) IV Push every 4 hours PRN  morphine  - Injectable 4 milliGRAM(s) IV Push every 4 hours PRN  naloxone Injectable 0.4 milliGRAM(s) IV Push once  omega-3-Acid Ethyl Esters 2 Gram(s) Oral two times a day  ondansetron Injectable 4 milliGRAM(s) IV Push every 8 hours PRN  pantoprazole    Tablet 40 milliGRAM(s) Oral before breakfast  polyethylene glycol 3350 17 Gram(s) Oral daily  senna 2 Tablet(s) Oral at bedtime  
  MICAELA BRYAN  82y  Female      Patient is a 82y old  Female who presents with a chief complaint of LE edema (11 Jan 2023 18:13)      INTERVAL HPI/OVERNIGHT EVENTS:  Patient seen and examined earlier this morning; patient's daughter at bedside; patient appears comfortable and denies any complaints. Spoke to patient's daughter and son-in-law regarding for plan of care.      REVIEW OF SYSTEMS:  CONSTITUTIONAL: No fever, weight loss, or fatigue  EYES: No eye pain, visual disturbances, or discharge  ENMT:  No difficulty hearing, tinnitus, vertigo; No sinus or throat pain  NECK: No pain or stiffness  RESPIRATORY: No cough, wheezing, chills or hemoptysis; No shortness of breath  CARDIOVASCULAR: No chest pain, palpitations, dizziness, or leg swelling  GASTROINTESTINAL: No abdominal or epigastric pain. No nausea, vomiting, or hematemesis; No diarrhea or constipation.  GENITOURINARY: No dysuria, frequency, hematuria, or incontinence  NEUROLOGICAL: No headaches, memory loss, loss of strength, numbness, or tremors  MUSCULOSKELETAL: No joint pain or swelling; No muscle, back, or extremity pain, +edema in b/l LE      Vital Signs Last 24 Hrs  T(C): 36.3 (13 Jan 2023 05:00), Max: 36.6 (12 Jan 2023 20:22)  T(F): 97.4 (13 Jan 2023 05:00), Max: 97.9 (12 Jan 2023 20:22)  HR: 73 (13 Jan 2023 05:00) (73 - 77)  BP: 122/59 (13 Jan 2023 05:00) (122/59 - 138/63)  BP(mean): --  RR: 18 (13 Jan 2023 05:00) (18 - 18)  SpO2: 97% (12 Jan 2023 20:22) (97% - 97%)    PHYSICAL EXAM:  GENERAL: NAD, well-groomed, well-developed  HEAD:  Atraumatic, Normocephalic  EYES: EOMI, PERRLA, conjunctiva and sclera clear  ENMT: No tonsillar erythema, exudates, or enlargement; Moist mucous membranes  NECK: Supple, No JVD, Normal thyroid  NERVOUS SYSTEM:  Alert & Oriented X3, Good concentration; Motor Strength 5/5 B/L upper and lower extremities  CHEST/LUNG: Clear to percussion bilaterally; No rales, rhonchi, wheezing, or rubs  HEART: Regular rate and rhythm; No murmurs, rubs, or gallops  ABDOMEN: Soft, Nontender, Nondistended; Bowel sounds present  EXTREMITIES:  2+ Peripheral Pulses, No clubbing, cyanosis, +b/l LE edema      Consultant(s) Notes Reviewed:  [x ] YES  [ ] NO  Care Discussed with Consultants/Other Providers [ x] YES  [ ] NO    LAB:                        8.5    7.81  )-----------( 213      ( 13 Jan 2023 06:32 )             25.8   01-13    140  |  105  |  29<H>  ----------------------------<  109<H>  4.4   |  26  |  1.1    Ca    8.5      13 Jan 2023 06:32    TPro  5.1<L>  /  Alb  2.8<L>  /  TBili  1.0  /  DBili  x   /  AST  94<H>  /  ALT  49<H>  /  AlkPhos  438<H>  01-13            Drug Dosing Weight  Height (cm): 160 (11 Jan 2023 18:00)  Weight (kg): 54.5 (11 Jan 2023 18:00)  BMI (kg/m2): 21.3 (11 Jan 2023 18:00)  BSA (m2): 1.56 (11 Jan 2023 18:00)  Height (cm): 160 (01-11-23 @ 18:00)  Weight (kg): 54.5 (01-11-23 @ 18:00)  BMI (kg/m2): 21.3 (01-11-23 @ 18:00)  BSA (m2): 1.56 (01-11-23 @ 18:00)  CAPILLARY BLOOD GLUCOSE        I&O's Summary        RADIOLOGY & ADDITIONAL TESTS:  Imaging Personally Reviewed:  [x] YES  [ ] NO    HEALTH ISSUES - PROBLEM Dx:          MEDICATIONS  (STANDING):  apixaban 10 milliGRAM(s) Oral every 12 hours  atorvastatin 10 milliGRAM(s) Oral at bedtime  chlorhexidine 4% Liquid 1 Application(s) Topical <User Schedule>  furosemide    Tablet 20 milliGRAM(s) Oral every 12 hours  influenza  Vaccine (HIGH DOSE) 0.7 milliLiter(s) IntraMuscular once  losartan 50 milliGRAM(s) Oral daily  memantine 10 milliGRAM(s) Oral two times a day  naloxone Injectable 0.4 milliGRAM(s) IV Push once  omega-3-Acid Ethyl Esters 2 Gram(s) Oral two times a day  pantoprazole    Tablet 40 milliGRAM(s) Oral before breakfast  polyethylene glycol 3350 17 Gram(s) Oral daily  senna 2 Tablet(s) Oral at bedtime    MEDICATIONS  (PRN):  acetaminophen     Tablet .. 650 milliGRAM(s) Oral every 6 hours PRN Temp greater or equal to 38C (100.4F), Mild Pain (1 - 3)  aluminum hydroxide/magnesium hydroxide/simethicone Suspension 30 milliLiter(s) Oral every 4 hours PRN Dyspepsia  bisacodyl 5 milliGRAM(s) Oral daily PRN Constipation  meclizine 25 milliGRAM(s) Oral daily PRN Dizziness  melatonin 3 milliGRAM(s) Oral at bedtime PRN Insomnia  morphine  - Injectable 2 milliGRAM(s) IV Push every 4 hours PRN Moderate Pain (4 - 6)  morphine  - Injectable 4 milliGRAM(s) IV Push every 4 hours PRN Severe Pain (7 - 10)  ondansetron Injectable 4 milliGRAM(s) IV Push every 8 hours PRN Nausea and/or Vomiting  
BRYAN HINOJOSA  82y  Female      Patient is a 82y old  Female who presents with a chief complaint of LE edema (11 Jan 2023 18:13)      INTERVAL HPI/OVERNIGHT EVENTS:  Patient seen and examined earlier this morning; patient's daughter at bedside; patient appears comfortable and denies any complaints. Discussed plan of care with them at bedside. Discharge planning was discussed with daughter.      REVIEW OF SYSTEMS:  CONSTITUTIONAL: No fever, weight loss, or fatigue  EYES: No eye pain, visual disturbances, or discharge  ENMT:  No difficulty hearing, tinnitus, vertigo; No sinus or throat pain  NECK: No pain or stiffness  RESPIRATORY: No cough, wheezing, chills or hemoptysis; No shortness of breath  CARDIOVASCULAR: No chest pain, palpitations, dizziness, or leg swelling  GASTROINTESTINAL: No abdominal or epigastric pain. No nausea, vomiting, or hematemesis; No diarrhea or constipation.  GENITOURINARY: No dysuria, frequency, hematuria, or incontinence  NEUROLOGICAL: No headaches, memory loss, loss of strength, numbness, or tremors  MUSCULOSKELETAL: No joint pain or swelling; No muscle, back, or extremity pain, +edema in b/l LE      Vital Signs Last 24 Hrs  T(C): 36.2 (16 Jan 2023 05:21), Max: 36.2 (16 Jan 2023 05:21)  T(F): 97.2 (16 Jan 2023 05:21), Max: 97.2 (16 Jan 2023 05:21)  HR: 78 (16 Jan 2023 05:21) (78 - 86)  BP: 116/58 (16 Jan 2023 05:21) (110/56 - 125/61)  BP(mean): --  RR: 18 (16 Jan 2023 05:21) (18 - 18)  SpO2: --  PHYSICAL EXAM:  GENERAL: NAD, well-groomed, well-developed  HEAD:  Atraumatic, Normocephalic  EYES: EOMI, PERRLA, conjunctiva and sclera clear  ENMT: No tonsillar erythema, exudates, or enlargement; Moist mucous membranes  NECK: Supple, No JVD, Normal thyroid  NERVOUS SYSTEM:  Alert & Oriented X3, Good concentration; Motor Strength 5/5 B/L upper and lower extremities  CHEST/LUNG: Clear to percussion bilaterally; No rales, rhonchi, wheezing, or rubs  HEART: Regular rate and rhythm; No murmurs, rubs, or gallops  ABDOMEN: Soft, Nontender, Nondistended; Bowel sounds present  EXTREMITIES:  2+ Peripheral Pulses, No clubbing, cyanosis, +b/l LE edema      Consultant(s) Notes Reviewed:  [x ] YES  [ ] NO  Care Discussed with Consultants/Other Providers [ x] YES  [ ] NO    LAB:          Drug Dosing Weight  Height (cm): 160 (11 Jan 2023 18:00)  Weight (kg): 54.5 (11 Jan 2023 18:00)  BMI (kg/m2): 21.3 (11 Jan 2023 18:00)  BSA (m2): 1.56 (11 Jan 2023 18:00)  Height (cm): 160 (01-11-23 @ 18:00)  Weight (kg): 54.5 (01-11-23 @ 18:00)  BMI (kg/m2): 21.3 (01-11-23 @ 18:00)  BSA (m2): 1.56 (01-11-23 @ 18:00)  CAPILLARY BLOOD GLUCOSE        I&O's Summary        RADIOLOGY & ADDITIONAL TESTS:  Imaging Personally Reviewed:  [x] YES  [ ] NO    HEALTH ISSUES - PROBLEM Dx:          MEDICATIONS  (STANDING):  atorvastatin 10 milliGRAM(s) Oral at bedtime  chlorhexidine 4% Liquid 1 Application(s) Topical <User Schedule>  furosemide    Tablet 20 milliGRAM(s) Oral every 12 hours  losartan 50 milliGRAM(s) Oral daily  memantine 10 milliGRAM(s) Oral two times a day  naloxone Injectable 0.4 milliGRAM(s) IV Push once  omega-3-Acid Ethyl Esters 2 Gram(s) Oral two times a day  pantoprazole    Tablet 40 milliGRAM(s) Oral before breakfast  polyethylene glycol 3350 17 Gram(s) Oral daily  senna 2 Tablet(s) Oral at bedtime    MEDICATIONS  (PRN):  acetaminophen     Tablet .. 650 milliGRAM(s) Oral every 6 hours PRN Temp greater or equal to 38C (100.4F), Mild Pain (1 - 3)  aluminum hydroxide/magnesium hydroxide/simethicone Suspension 30 milliLiter(s) Oral every 4 hours PRN Dyspepsia  bisacodyl 5 milliGRAM(s) Oral daily PRN Constipation  meclizine 25 milliGRAM(s) Oral daily PRN Dizziness  melatonin 3 milliGRAM(s) Oral at bedtime PRN Insomnia  morphine  - Injectable 2 milliGRAM(s) IV Push every 4 hours PRN Moderate Pain (4 - 6)  morphine  - Injectable 4 milliGRAM(s) IV Push every 4 hours PRN Severe Pain (7 - 10)  ondansetron Injectable 4 milliGRAM(s) IV Push every 8 hours PRN Nausea and/or Vomiting    
MICAELABRYAN  82y  Female      Patient is a 82y old  Female who presents with a chief complaint of LE edema (11 Jan 2023 18:13)      INTERVAL HPI/OVERNIGHT EVENTS:  Patient seen and examined earlier this morning; patient's daughter at bedside; patient appears comfortable and denies any complaints. Discussed plan of care with them at bedside.      REVIEW OF SYSTEMS:  CONSTITUTIONAL: No fever, weight loss, or fatigue  EYES: No eye pain, visual disturbances, or discharge  ENMT:  No difficulty hearing, tinnitus, vertigo; No sinus or throat pain  NECK: No pain or stiffness  RESPIRATORY: No cough, wheezing, chills or hemoptysis; No shortness of breath  CARDIOVASCULAR: No chest pain, palpitations, dizziness, or leg swelling  GASTROINTESTINAL: No abdominal or epigastric pain. No nausea, vomiting, or hematemesis; No diarrhea or constipation.  GENITOURINARY: No dysuria, frequency, hematuria, or incontinence  NEUROLOGICAL: No headaches, memory loss, loss of strength, numbness, or tremors  MUSCULOSKELETAL: No joint pain or swelling; No muscle, back, or extremity pain, +edema in b/l LE      Vital Signs Last 24 Hrs  T(C): 35.7 (15 Brian 2023 05:01), Max: 36.1 (14 Jan 2023 20:05)  T(F): 96.3 (15 Brian 2023 05:01), Max: 97 (14 Jan 2023 20:05)  HR: 78 (15 Brian 2023 05:01) (78 - 80)  BP: 122/60 (15 Brian 2023 05:01) (109/52 - 122/60)  BP(mean): --  RR: 18 (15 Brian 2023 05:01) (18 - 18)  SpO2: 96% (14 Jan 2023 20:05) (95% - 96%)    Parameters below as of 14 Jan 2023 16:11  Patient On (Oxygen Delivery Method): room air    PHYSICAL EXAM:  GENERAL: NAD, well-groomed, well-developed  HEAD:  Atraumatic, Normocephalic  EYES: EOMI, PERRLA, conjunctiva and sclera clear  ENMT: No tonsillar erythema, exudates, or enlargement; Moist mucous membranes  NECK: Supple, No JVD, Normal thyroid  NERVOUS SYSTEM:  Alert & Oriented X3, Good concentration; Motor Strength 5/5 B/L upper and lower extremities  CHEST/LUNG: Clear to percussion bilaterally; No rales, rhonchi, wheezing, or rubs  HEART: Regular rate and rhythm; No murmurs, rubs, or gallops  ABDOMEN: Soft, Nontender, Nondistended; Bowel sounds present  EXTREMITIES:  2+ Peripheral Pulses, No clubbing, cyanosis, +b/l LE edema      Consultant(s) Notes Reviewed:  [x ] YES  [ ] NO  Care Discussed with Consultants/Other Providers [ x] YES  [ ] NO    LAB:          Drug Dosing Weight  Height (cm): 160 (11 Jan 2023 18:00)  Weight (kg): 54.5 (11 Jan 2023 18:00)  BMI (kg/m2): 21.3 (11 Jan 2023 18:00)  BSA (m2): 1.56 (11 Jan 2023 18:00)  Height (cm): 160 (01-11-23 @ 18:00)  Weight (kg): 54.5 (01-11-23 @ 18:00)  BMI (kg/m2): 21.3 (01-11-23 @ 18:00)  BSA (m2): 1.56 (01-11-23 @ 18:00)  CAPILLARY BLOOD GLUCOSE        I&O's Summary        RADIOLOGY & ADDITIONAL TESTS:  Imaging Personally Reviewed:  [x] YES  [ ] NO    HEALTH ISSUES - PROBLEM Dx:          MEDICATIONS  (STANDING):  atorvastatin 10 milliGRAM(s) Oral at bedtime  chlorhexidine 4% Liquid 1 Application(s) Topical <User Schedule>  furosemide    Tablet 20 milliGRAM(s) Oral every 12 hours  influenza  Vaccine (HIGH DOSE) 0.7 milliLiter(s) IntraMuscular once  losartan 50 milliGRAM(s) Oral daily  memantine 10 milliGRAM(s) Oral two times a day  naloxone Injectable 0.4 milliGRAM(s) IV Push once  omega-3-Acid Ethyl Esters 2 Gram(s) Oral two times a day  pantoprazole    Tablet 40 milliGRAM(s) Oral before breakfast  polyethylene glycol 3350 17 Gram(s) Oral daily  senna 2 Tablet(s) Oral at bedtime    MEDICATIONS  (PRN):  acetaminophen     Tablet .. 650 milliGRAM(s) Oral every 6 hours PRN Temp greater or equal to 38C (100.4F), Mild Pain (1 - 3)  aluminum hydroxide/magnesium hydroxide/simethicone Suspension 30 milliLiter(s) Oral every 4 hours PRN Dyspepsia  bisacodyl 5 milliGRAM(s) Oral daily PRN Constipation  meclizine 25 milliGRAM(s) Oral daily PRN Dizziness  melatonin 3 milliGRAM(s) Oral at bedtime PRN Insomnia  morphine  - Injectable 2 milliGRAM(s) IV Push every 4 hours PRN Moderate Pain (4 - 6)  morphine  - Injectable 4 milliGRAM(s) IV Push every 4 hours PRN Severe Pain (7 - 10)  ondansetron Injectable 4 milliGRAM(s) IV Push every 8 hours PRN Nausea and/or Vomiting

## 2023-01-16 NOTE — DISCHARGE NOTE PROVIDER - CARE PROVIDER_API CALL
Mendez Whitten  Internal Medicine  [address]   [place] [address]  Phone: [phone]  Fax: [phone]  Follow Up Time: 1 week    Jose Granado)  Hematology; Internal Medicine; Medical Oncology  [address] [place] [address]  Phone: [phone]  Fax: [phone]  Scheduled Appointment: 01/18/2023    Josué Everett)  Radiology Physicians  [address] [place] [address]  Phone: [phone]  Fax: [phone]  Scheduled Appointment: 01/18/2023

## 2023-01-16 NOTE — DISCHARGE NOTE PROVIDER - NSDCMRMEDTOKEN_GEN_ALL_CORE_FT
aluminum hydroxide-magnesium hydroxide 200 mg-200 mg/5 mL oral suspension: 30 milliliter(s) orally every 4 hours, As needed, Dyspepsia  apixaban 5 mg oral tablet: 1 tab(s) orally every 12 hours   atorvastatin 10 mg oral tablet: 1 tab(s) orally once a day (at bedtime)  bisacodyl 5 mg oral delayed release tablet: 1 tab(s) orally once a day, As needed, Constipation  Lasix 20 mg oral tablet: 1 tab(s) orally 2 times a day   losartan 50 mg oral tablet: 1 tab(s) orally once a day  meclizine 25 mg oral tablet: 1 tab(s) orally once a day, As Needed  memantine 10 mg oral tablet: 1 tab(s) orally 2 times a day  polyethylene glycol 3350 oral powder for reconstitution: 17 gram(s) orally once a day  senna leaf extract oral tablet: 2 tab(s) orally once a day (at bedtime)  Vascepa 1 g oral capsule: 2 cap(s) orally 2 times a day   aluminum hydroxide-magnesium hydroxide 200 mg-200 mg/5 mL oral suspension: 30 milliliter(s) orally every 4 hours, As needed, Dyspepsia  apixaban 5 mg oral tablet: 1 tab(s) orally every 12 hours   atorvastatin 10 mg oral tablet: 1 tab(s) orally once a day (at bedtime)  bisacodyl 5 mg oral delayed release tablet: 1 tab(s) orally once a day, As needed, Constipation  Lasix 20 mg oral tablet: 1 tab(s) orally 2 times a day   losartan 50 mg oral tablet: 1 tab(s) orally once a day  meclizine 25 mg oral tablet: 1 tab(s) orally once a day, As Needed  memantine 10 mg oral tablet: 1 tab(s) orally 2 times a day  oxyCODONE 5 mg oral tablet: 1 tab(s) orally every 6 hours MDD:4  polyethylene glycol 3350 oral powder for reconstitution: 17 gram(s) orally once a day  senna leaf extract oral tablet: 2 tab(s) orally once a day (at bedtime)  Vascepa 1 g oral capsule: 2 cap(s) orally 2 times a day

## 2023-01-16 NOTE — DISCHARGE NOTE PROVIDER - CARE PROVIDERS DIRECT ADDRESSES
,DirectAddress_Unknown,[email or web address],DirectAddress_Unknown

## 2023-01-16 NOTE — DISCHARGE NOTE PROVIDER - PROVIDER TOKENS
PROVIDER:[TOKEN:[[id number]],FOLLOWUP:[1 week]],PROVIDER:[TOKEN:[[id number]],SCHEDULEDAPPT:[01/18/2023]],PROVIDER:[TOKEN:[[id number]],SCHEDULEDAPPT:[01/18/2023]]

## 2023-01-16 NOTE — DISCHARGE NOTE PROVIDER - NSDCFUADDINST_GEN_ALL_CORE_FT
Things to follow up:  -PCP follow up in 1 week  -Follow up with interventional radiologist on 1/18/2023 for liver biopsy  - Instruction for patient upon dc:  > your procedure is: liver biopsy, scheduled for Wednesday 1/18 in Interventional Radiology  >do not eat or drink after midnight on Tuesday 1/17  >please hold your anticoagulation Eliquis for 3 days (6 doses)  >You will report to [address] [place] [address] on the morning of your procedure  >You will receive phone call 24hr prior to procedure with procedure arrival time and additional instructions  > if any questions regarding your procedure please call: [phone]  -Follow up with oncologist Jose Sanchez ([place]) after liver biopsy, patient will need follow-up as outpatient depending on all the ancillary studies and her performance status they will decide on treatment plan  -After the biopsy start taking eliquis 5mg twice a day  -Take lasix 20mg twice a day and elevate lower extremity

## 2023-01-17 ENCOUNTER — APPOINTMENT (OUTPATIENT)
Dept: UROLOGY | Facility: CLINIC | Age: 83
End: 2023-01-17

## 2023-01-18 ENCOUNTER — OUTPATIENT (OUTPATIENT)
Dept: OUTPATIENT SERVICES | Facility: HOSPITAL | Age: 83
LOS: 1 days | Discharge: HOME | End: 2023-01-18
Payer: MEDICARE

## 2023-01-18 ENCOUNTER — TRANSCRIPTION ENCOUNTER (OUTPATIENT)
Age: 83
End: 2023-01-18

## 2023-01-18 ENCOUNTER — RESULT REVIEW (OUTPATIENT)
Age: 83
End: 2023-01-18

## 2023-01-18 VITALS
HEART RATE: 82 BPM | RESPIRATION RATE: 18 BRPM | SYSTOLIC BLOOD PRESSURE: 115 MMHG | DIASTOLIC BLOOD PRESSURE: 60 MMHG | OXYGEN SATURATION: 100 %

## 2023-01-18 VITALS
HEART RATE: 85 BPM | OXYGEN SATURATION: 97 % | RESPIRATION RATE: 18 BRPM | DIASTOLIC BLOOD PRESSURE: 62 MMHG | TEMPERATURE: 99 F | SYSTOLIC BLOOD PRESSURE: 122 MMHG

## 2023-01-18 DIAGNOSIS — Z90.10 ACQUIRED ABSENCE OF UNSPECIFIED BREAST AND NIPPLE: Chronic | ICD-10-CM

## 2023-01-18 PROCEDURE — 88305 TISSUE EXAM BY PATHOLOGIST: CPT | Mod: 26

## 2023-01-18 PROCEDURE — 99152 MOD SED SAME PHYS/QHP 5/>YRS: CPT

## 2023-01-18 PROCEDURE — 47000 NEEDLE BIOPSY OF LIVER PERQ: CPT

## 2023-01-18 PROCEDURE — 88342 IMHCHEM/IMCYTCHM 1ST ANTB: CPT | Mod: 26

## 2023-01-18 PROCEDURE — 88341 IMHCHEM/IMCYTCHM EA ADD ANTB: CPT | Mod: 26

## 2023-01-18 PROCEDURE — 88333 PATH CONSLTJ SURG CYTO XM 1: CPT | Mod: 26

## 2023-01-18 PROCEDURE — 77012 CT SCAN FOR NEEDLE BIOPSY: CPT | Mod: 26

## 2023-01-18 NOTE — PROGRESS NOTE ADULT - SUBJECTIVE AND OBJECTIVE BOX
INTERVENTIONAL RADIOLOGY BRIEF-OPERATIVE NOTE    Procedure: liver lesion biopsy    Pre-Op Diagnosis: liver masses    Post-Op Diagnosis: same as pre    Attending: Anitra  Resident:     Anesthesia (type):  [ ] General Anesthesia  [ ] Deep Sedation - provided by anesthesiologist  [x ] Conscious Sedation - 1mg Versed and 50mcg Fentanyl   [x ] Local/Regional    Contrast: 0    Estimated Blood Loss: 0    Condition:   [ ] Critical  [ ] Serious  [ ] Fair   [x ] Good    Findings/Follow up Plan of Care: Right liver lesion biopsy with US guidance. 18G core x 3. gelfoam tract embolization    Specimens Removed: see above    Implants: none    Complications: none    Disposition: home after recovery      Please call Interventional Radiology [phone] with any questions, concerns, or issues.

## 2023-01-18 NOTE — ASU PATIENT PROFILE, ADULT - WILL THE PATIENT ACCEPT THE PFIZER COVID-19 VACCINE IF ELIGIBLE AND IT IS AVAILABLE?
Resting in bed. VSS. No s/s of distress. Pt tolerating BiPAP well. Will continue to closely monitor.    No

## 2023-01-18 NOTE — ASU DISCHARGE PLAN (ADULT/PEDIATRIC) - NS MD DC FALL RISK RISK
For information on Fall & Injury Prevention, visit: [email or web address] OR  [email or web address] OR  https://www.cdc.gov/steadi/patient.html

## 2023-01-18 NOTE — ASU PATIENT PROFILE, ADULT - FALL HARM RISK - HARM RISK INTERVENTIONS

## 2023-01-19 DIAGNOSIS — Z20.822 CONTACT WITH AND (SUSPECTED) EXPOSURE TO COVID-19: ICD-10-CM

## 2023-01-19 DIAGNOSIS — Z88.6 ALLERGY STATUS TO ANALGESIC AGENT: ICD-10-CM

## 2023-01-19 DIAGNOSIS — J91.0 MALIGNANT PLEURAL EFFUSION: ICD-10-CM

## 2023-01-19 DIAGNOSIS — I26.99 OTHER PULMONARY EMBOLISM WITHOUT ACUTE COR PULMONALE: ICD-10-CM

## 2023-01-19 DIAGNOSIS — R60.9 EDEMA, UNSPECIFIED: ICD-10-CM

## 2023-01-19 DIAGNOSIS — R18.0 MALIGNANT ASCITES: ICD-10-CM

## 2023-01-19 DIAGNOSIS — Z90.10 ACQUIRED ABSENCE OF UNSPECIFIED BREAST AND NIPPLE: ICD-10-CM

## 2023-01-19 DIAGNOSIS — I10 ESSENTIAL (PRIMARY) HYPERTENSION: ICD-10-CM

## 2023-01-19 DIAGNOSIS — E78.2 MIXED HYPERLIPIDEMIA: ICD-10-CM

## 2023-01-19 DIAGNOSIS — C23 MALIGNANT NEOPLASM OF GALLBLADDER: ICD-10-CM

## 2023-01-19 DIAGNOSIS — Z88.0 ALLERGY STATUS TO PENICILLIN: ICD-10-CM

## 2023-01-19 DIAGNOSIS — C78.7 SECONDARY MALIGNANT NEOPLASM OF LIVER AND INTRAHEPATIC BILE DUCT: ICD-10-CM

## 2023-01-19 DIAGNOSIS — Z85.3 PERSONAL HISTORY OF MALIGNANT NEOPLASM OF BREAST: ICD-10-CM

## 2023-01-19 DIAGNOSIS — Z51.5 ENCOUNTER FOR PALLIATIVE CARE: ICD-10-CM

## 2023-01-22 ENCOUNTER — INPATIENT (INPATIENT)
Facility: HOSPITAL | Age: 83
LOS: 5 days | Discharge: HOSPICE | End: 2023-01-28
Attending: HOSPITALIST | Admitting: HOSPITALIST
Payer: MEDICARE

## 2023-01-22 VITALS
OXYGEN SATURATION: 99 % | DIASTOLIC BLOOD PRESSURE: 56 MMHG | HEART RATE: 81 BPM | TEMPERATURE: 97 F | HEIGHT: 63 IN | SYSTOLIC BLOOD PRESSURE: 115 MMHG | RESPIRATION RATE: 16 BRPM

## 2023-01-22 DIAGNOSIS — Z90.10 ACQUIRED ABSENCE OF UNSPECIFIED BREAST AND NIPPLE: Chronic | ICD-10-CM

## 2023-01-22 LAB
ALBUMIN SERPL ELPH-MCNC: 2.5 G/DL — LOW (ref 3.5–5.2)
ALP SERPL-CCNC: 695 U/L — HIGH (ref 30–115)
ALT FLD-CCNC: 124 U/L — HIGH (ref 0–41)
ANION GAP SERPL CALC-SCNC: 13 MMOL/L — SIGNIFICANT CHANGE UP (ref 7–14)
AST SERPL-CCNC: 271 U/L — HIGH (ref 0–41)
BASE EXCESS BLDV CALC-SCNC: -2.6 MMOL/L — LOW (ref -2–3)
BASOPHILS # BLD AUTO: 0.07 K/UL — SIGNIFICANT CHANGE UP (ref 0–0.2)
BASOPHILS NFR BLD AUTO: 0.5 % — SIGNIFICANT CHANGE UP (ref 0–1)
BILIRUB DIRECT SERPL-MCNC: 2.8 MG/DL — HIGH (ref 0–0.3)
BILIRUB INDIRECT FLD-MCNC: 1.1 MG/DL — SIGNIFICANT CHANGE UP (ref 0.2–1.2)
BILIRUB SERPL-MCNC: 3.9 MG/DL — HIGH (ref 0.2–1.2)
BUN SERPL-MCNC: 98 MG/DL — CRITICAL HIGH (ref 10–20)
CA-I SERPL-SCNC: 1.18 MMOL/L — SIGNIFICANT CHANGE UP (ref 1.15–1.33)
CALCIUM SERPL-MCNC: 8.2 MG/DL — LOW (ref 8.4–10.4)
CHLORIDE SERPL-SCNC: 104 MMOL/L — SIGNIFICANT CHANGE UP (ref 98–110)
CO2 SERPL-SCNC: 22 MMOL/L — SIGNIFICANT CHANGE UP (ref 17–32)
CREAT SERPL-MCNC: 2.4 MG/DL — HIGH (ref 0.7–1.5)
EGFR: 20 ML/MIN/1.73M2 — LOW
EOSINOPHIL # BLD AUTO: 0.01 K/UL — SIGNIFICANT CHANGE UP (ref 0–0.7)
EOSINOPHIL NFR BLD AUTO: 0.1 % — SIGNIFICANT CHANGE UP (ref 0–8)
GAS PNL BLDV: 136 MMOL/L — SIGNIFICANT CHANGE UP (ref 136–145)
GAS PNL BLDV: SIGNIFICANT CHANGE UP
GLUCOSE SERPL-MCNC: 136 MG/DL — HIGH (ref 70–99)
HCO3 BLDV-SCNC: 23 MMOL/L — SIGNIFICANT CHANGE UP (ref 22–29)
HCT VFR BLD CALC: 26.1 % — LOW (ref 37–47)
HCT VFR BLDA CALC: 27 % — LOW (ref 39–51)
HGB BLD CALC-MCNC: 9.1 G/DL — LOW (ref 12.6–17.4)
HGB BLD-MCNC: 8.3 G/DL — LOW (ref 12–16)
IMM GRANULOCYTES NFR BLD AUTO: 2.3 % — HIGH (ref 0.1–0.3)
LACTATE BLDV-MCNC: 1.7 MMOL/L — SIGNIFICANT CHANGE UP (ref 0.5–2)
LIDOCAIN IGE QN: 37 U/L — SIGNIFICANT CHANGE UP (ref 7–60)
LYMPHOCYTES # BLD AUTO: 1.8 K/UL — SIGNIFICANT CHANGE UP (ref 1.2–3.4)
LYMPHOCYTES # BLD AUTO: 12.2 % — LOW (ref 20.5–51.1)
MCHC RBC-ENTMCNC: 31.8 G/DL — LOW (ref 32–37)
MCHC RBC-ENTMCNC: 32 PG — HIGH (ref 27–31)
MCV RBC AUTO: 100.8 FL — HIGH (ref 81–99)
MONOCYTES # BLD AUTO: 0.7 K/UL — HIGH (ref 0.1–0.6)
MONOCYTES NFR BLD AUTO: 4.7 % — SIGNIFICANT CHANGE UP (ref 1.7–9.3)
NEUTROPHILS # BLD AUTO: 11.88 K/UL — HIGH (ref 1.4–6.5)
NEUTROPHILS NFR BLD AUTO: 80.2 % — HIGH (ref 42.2–75.2)
NRBC # BLD: 0 /100 WBCS — SIGNIFICANT CHANGE UP (ref 0–0)
NT-PROBNP SERPL-SCNC: 1372 PG/ML — HIGH (ref 0–300)
PCO2 BLDV: 40 MMHG — SIGNIFICANT CHANGE UP (ref 39–42)
PH BLDV: 7.36 — SIGNIFICANT CHANGE UP (ref 7.32–7.43)
PLATELET # BLD AUTO: 295 K/UL — SIGNIFICANT CHANGE UP (ref 130–400)
PO2 BLDV: 51 MMHG — SIGNIFICANT CHANGE UP
POTASSIUM BLDV-SCNC: 4.8 MMOL/L — SIGNIFICANT CHANGE UP (ref 3.5–5.1)
POTASSIUM SERPL-MCNC: 4.9 MMOL/L — SIGNIFICANT CHANGE UP (ref 3.5–5)
POTASSIUM SERPL-SCNC: 4.9 MMOL/L — SIGNIFICANT CHANGE UP (ref 3.5–5)
PROT SERPL-MCNC: 5.3 G/DL — LOW (ref 6–8)
RBC # BLD: 2.59 M/UL — LOW (ref 4.2–5.4)
RBC # FLD: 17.1 % — HIGH (ref 11.5–14.5)
SAO2 % BLDV: 82.1 % — SIGNIFICANT CHANGE UP
SODIUM SERPL-SCNC: 139 MMOL/L — SIGNIFICANT CHANGE UP (ref 135–146)
TROPONIN T SERPL-MCNC: 0.05 NG/ML — CRITICAL HIGH
WBC # BLD: 14.8 K/UL — HIGH (ref 4.8–10.8)
WBC # FLD AUTO: 14.8 K/UL — HIGH (ref 4.8–10.8)

## 2023-01-22 PROCEDURE — 99285 EMERGENCY DEPT VISIT HI MDM: CPT

## 2023-01-22 PROCEDURE — 93010 ELECTROCARDIOGRAM REPORT: CPT

## 2023-01-22 PROCEDURE — 71045 X-RAY EXAM CHEST 1 VIEW: CPT | Mod: 26

## 2023-01-22 PROCEDURE — 70450 CT HEAD/BRAIN W/O DYE: CPT | Mod: 26,MA

## 2023-01-22 NOTE — ED PROVIDER NOTE - OBJECTIVE STATEMENT
83 yo female with a pmh of GB cancer with Liver mets, breast cancer s/p mastectomy, HTN, HLD presents for hypoxia. pt accompanied by daughter and son-in-law who states pt looked weak and when they checked her pulse ox it was 88%. family has also noted edema to BLE and abdomen since her cancer diagnosis. no fevers, chill, headache, recent illness/travel, cough, chest pain.     id# [id number]

## 2023-01-22 NOTE — ED PROVIDER NOTE - CLINICAL SUMMARY MEDICAL DECISION MAKING FREE TEXT BOX
EKG reviewed by me shows NSR, MA/QRS/QTC intervals are WNL, non-specific T wave changes noted.   CXR shows no pneumothorax, ?atelectasis Vs. consolidation, no free air   Laboratory results reviewed and discussed with patient. CBC shows elevated WBC with anemia. BMP shows worsening Bun/Cr, elevated ProBNP and troponin levels.   Patient remained hemodynamically stable during the course of ED stay. Discussed with patient about the results of the diagnostic studies. Discussed with admitting physician and MAR, patient is admitted to Medicine for further evaluation and care.  Translation services used.

## 2023-01-22 NOTE — ED PROVIDER NOTE - PHYSICAL EXAMINATION
Gen: NAD  Head: NCAT  HEENT: PERRL, oral mucosa moist, normal conjunctiva, oropharynx clear without exudate or erythema  Lung: CTAB, no respiratory distress, no wheezing, rales, rhonchi  CV: normal s1/s2, rrr, Normal perfusion, pulses 2+ throughout  Abd: soft, distention, diffuse tenderness, no CVA tenderness  Genitourinary: no pelvic tenderness  MSK: BLE edema, no visible deformities  Neuro: No focal neurologic deficits  Skin: No rash   Psych: normal affect

## 2023-01-22 NOTE — ED PROVIDER NOTE - DIFFERENTIAL DIAGNOSIS
cardiac ischemia, cardiac arrhythmia, acute coronary syndromes, symptomatic anemia, electrolyte abnormalities including hyponatremia and hypokalemia, and pneumothorax. Pulmonary embolism and aotric dissection are less likely.  Pancreatitis, hepatic failure, obstructive uropathy, diverticulitis, colitis, abscess, bowel obstruction, bowel perforation. Electrolyte abnormalities, KARINA, and GI bleeding. Differential Diagnosis

## 2023-01-22 NOTE — ED PROVIDER NOTE - CARE PLAN
Principal Discharge DX:	Abdominal pain  Secondary Diagnosis:	Elevated liver enzymes  Secondary Diagnosis:	Acute-on-chronic kidney injury  Secondary Diagnosis:	Leukocytosis   1

## 2023-01-22 NOTE — ED PROVIDER NOTE - NS ED ATTENDING STATEMENT MOD
This was a shared visit with the LUIS E. I reviewed and verified the documentation and independently performed the documented:

## 2023-01-22 NOTE — ED PROVIDER NOTE - ATTENDING APP SHARED VISIT CONTRIBUTION OF CARE
Patient is BIB her daughter and son-in law for evaluation of an episode of presyncope with drop in oxygen saturation. No trauma, no seizure like activity.   Vitals reviewed.   Lungs: B/L decreased air entry, no crackles.   Abd: +BS +healing recent biopsy wound area, +generalized tenderness, +mild distension, soft,   +voluntary guarding.   A/P: Presyncope/hypoxia,   Abd pain,   labs, CT, EKG,   reevaluation.

## 2023-01-23 DIAGNOSIS — R41.82 ALTERED MENTAL STATUS, UNSPECIFIED: ICD-10-CM

## 2023-01-23 DIAGNOSIS — Z71.89 OTHER SPECIFIED COUNSELING: ICD-10-CM

## 2023-01-23 DIAGNOSIS — I26.99 OTHER PULMONARY EMBOLISM WITHOUT ACUTE COR PULMONALE: ICD-10-CM

## 2023-01-23 DIAGNOSIS — Z51.5 ENCOUNTER FOR PALLIATIVE CARE: ICD-10-CM

## 2023-01-23 DIAGNOSIS — N39.0 URINARY TRACT INFECTION, SITE NOT SPECIFIED: ICD-10-CM

## 2023-01-23 LAB
ALBUMIN SERPL ELPH-MCNC: 2.5 G/DL — LOW (ref 3.5–5.2)
ALBUMIN SERPL ELPH-MCNC: 2.6 G/DL — LOW (ref 3.5–5.2)
ALP SERPL-CCNC: 645 U/L — HIGH (ref 30–115)
ALP SERPL-CCNC: 679 U/L — HIGH (ref 30–115)
ALT FLD-CCNC: 135 U/L — HIGH (ref 0–41)
ALT FLD-CCNC: 175 U/L — HIGH (ref 0–41)
ANION GAP SERPL CALC-SCNC: 15 MMOL/L — HIGH (ref 7–14)
ANION GAP SERPL CALC-SCNC: 16 MMOL/L — HIGH (ref 7–14)
APPEARANCE UR: ABNORMAL
APTT BLD: 30.4 SEC — SIGNIFICANT CHANGE UP (ref 27–39.2)
AST SERPL-CCNC: 310 U/L — HIGH (ref 0–41)
AST SERPL-CCNC: 388 U/L — HIGH (ref 0–41)
BACTERIA # UR AUTO: ABNORMAL
BASOPHILS # BLD AUTO: 0.08 K/UL — SIGNIFICANT CHANGE UP (ref 0–0.2)
BASOPHILS NFR BLD AUTO: 0.6 % — SIGNIFICANT CHANGE UP (ref 0–1)
BILIRUB SERPL-MCNC: 4.2 MG/DL — HIGH (ref 0.2–1.2)
BILIRUB SERPL-MCNC: 4.5 MG/DL — HIGH (ref 0.2–1.2)
BILIRUB UR-MCNC: NEGATIVE — SIGNIFICANT CHANGE UP
BLD GP AB SCN SERPL QL: SIGNIFICANT CHANGE UP
BUN SERPL-MCNC: 105 MG/DL — CRITICAL HIGH (ref 10–20)
BUN SERPL-MCNC: 112 MG/DL — CRITICAL HIGH (ref 10–20)
CALCIUM SERPL-MCNC: 8.1 MG/DL — LOW (ref 8.4–10.5)
CALCIUM SERPL-MCNC: 8.1 MG/DL — LOW (ref 8.4–10.5)
CHLORIDE SERPL-SCNC: 104 MMOL/L — SIGNIFICANT CHANGE UP (ref 98–110)
CHLORIDE SERPL-SCNC: 106 MMOL/L — SIGNIFICANT CHANGE UP (ref 98–110)
CO2 SERPL-SCNC: 18 MMOL/L — SIGNIFICANT CHANGE UP (ref 17–32)
CO2 SERPL-SCNC: 18 MMOL/L — SIGNIFICANT CHANGE UP (ref 17–32)
COLOR SPEC: ABNORMAL
CREAT SERPL-MCNC: 2.6 MG/DL — HIGH (ref 0.7–1.5)
CREAT SERPL-MCNC: 3 MG/DL — HIGH (ref 0.7–1.5)
DIFF PNL FLD: ABNORMAL
EGFR: 15 ML/MIN/1.73M2 — LOW
EGFR: 18 ML/MIN/1.73M2 — LOW
EOSINOPHIL # BLD AUTO: 0 K/UL — SIGNIFICANT CHANGE UP (ref 0–0.7)
EOSINOPHIL NFR BLD AUTO: 0 % — SIGNIFICANT CHANGE UP (ref 0–8)
EPI CELLS # UR: 3 /HPF — SIGNIFICANT CHANGE UP (ref 0–5)
GLUCOSE SERPL-MCNC: 123 MG/DL — HIGH (ref 70–99)
GLUCOSE SERPL-MCNC: 135 MG/DL — HIGH (ref 70–99)
GLUCOSE UR QL: NEGATIVE — SIGNIFICANT CHANGE UP
HCT VFR BLD CALC: 25.9 % — LOW (ref 37–47)
HGB BLD-MCNC: 8.2 G/DL — LOW (ref 12–16)
HYALINE CASTS # UR AUTO: 7 /LPF — SIGNIFICANT CHANGE UP (ref 0–7)
IMM GRANULOCYTES NFR BLD AUTO: 2.9 % — HIGH (ref 0.1–0.3)
INR BLD: 2.1 RATIO — HIGH (ref 0.65–1.3)
KETONES UR-MCNC: NEGATIVE — SIGNIFICANT CHANGE UP
LEUKOCYTE ESTERASE UR-ACNC: ABNORMAL
LYMPHOCYTES # BLD AUTO: 1.32 K/UL — SIGNIFICANT CHANGE UP (ref 1.2–3.4)
LYMPHOCYTES # BLD AUTO: 9.6 % — LOW (ref 20.5–51.1)
MAGNESIUM SERPL-MCNC: 2.7 MG/DL — HIGH (ref 1.8–2.4)
MCHC RBC-ENTMCNC: 31.7 G/DL — LOW (ref 32–37)
MCHC RBC-ENTMCNC: 32.2 PG — HIGH (ref 27–31)
MCV RBC AUTO: 101.6 FL — HIGH (ref 81–99)
MONOCYTES # BLD AUTO: 0.66 K/UL — HIGH (ref 0.1–0.6)
MONOCYTES NFR BLD AUTO: 4.8 % — SIGNIFICANT CHANGE UP (ref 1.7–9.3)
NEUTROPHILS # BLD AUTO: 11.32 K/UL — HIGH (ref 1.4–6.5)
NEUTROPHILS NFR BLD AUTO: 82.1 % — HIGH (ref 42.2–75.2)
NITRITE UR-MCNC: NEGATIVE — SIGNIFICANT CHANGE UP
NON-GYNECOLOGICAL CYTOLOGY STUDY: SIGNIFICANT CHANGE UP
NRBC # BLD: 0 /100 WBCS — SIGNIFICANT CHANGE UP (ref 0–0)
PH UR: 5.5 — SIGNIFICANT CHANGE UP (ref 5–8)
PLATELET # BLD AUTO: 305 K/UL — SIGNIFICANT CHANGE UP (ref 130–400)
POTASSIUM SERPL-MCNC: 5.2 MMOL/L — HIGH (ref 3.5–5)
POTASSIUM SERPL-MCNC: 5.2 MMOL/L — HIGH (ref 3.5–5)
POTASSIUM SERPL-SCNC: 5.2 MMOL/L — HIGH (ref 3.5–5)
POTASSIUM SERPL-SCNC: 5.2 MMOL/L — HIGH (ref 3.5–5)
PROT SERPL-MCNC: 4.9 G/DL — LOW (ref 6–8)
PROT SERPL-MCNC: 4.9 G/DL — LOW (ref 6–8)
PROT UR-MCNC: ABNORMAL
PROTHROM AB SERPL-ACNC: 24.5 SEC — HIGH (ref 9.95–12.87)
RBC # BLD: 2.55 M/UL — LOW (ref 4.2–5.4)
RBC # FLD: 17.3 % — HIGH (ref 11.5–14.5)
RBC CASTS # UR COMP ASSIST: 178 /HPF — HIGH (ref 0–4)
SARS-COV-2 RNA SPEC QL NAA+PROBE: SIGNIFICANT CHANGE UP
SODIUM SERPL-SCNC: 138 MMOL/L — SIGNIFICANT CHANGE UP (ref 135–146)
SODIUM SERPL-SCNC: 139 MMOL/L — SIGNIFICANT CHANGE UP (ref 135–146)
SP GR SPEC: 1.02 — SIGNIFICANT CHANGE UP (ref 1.01–1.03)
TROPONIN T SERPL-MCNC: 0.05 NG/ML — CRITICAL HIGH
UROBILINOGEN FLD QL: ABNORMAL
WBC # BLD: 13.78 K/UL — HIGH (ref 4.8–10.8)
WBC # FLD AUTO: 13.78 K/UL — HIGH (ref 4.8–10.8)
WBC UR QL: >720 /HPF — HIGH (ref 0–5)

## 2023-01-23 PROCEDURE — 99223 1ST HOSP IP/OBS HIGH 75: CPT

## 2023-01-23 PROCEDURE — 99233 SBSQ HOSP IP/OBS HIGH 50: CPT

## 2023-01-23 PROCEDURE — 99222 1ST HOSP IP/OBS MODERATE 55: CPT

## 2023-01-23 PROCEDURE — 74177 CT ABD & PELVIS W/CONTRAST: CPT | Mod: 26,MA

## 2023-01-23 PROCEDURE — 93970 EXTREMITY STUDY: CPT | Mod: 26

## 2023-01-23 PROCEDURE — 76770 US EXAM ABDO BACK WALL COMP: CPT | Mod: 26,59

## 2023-01-23 PROCEDURE — 93306 TTE W/DOPPLER COMPLETE: CPT | Mod: 26

## 2023-01-23 PROCEDURE — 71275 CT ANGIOGRAPHY CHEST: CPT | Mod: 26,MA

## 2023-01-23 PROCEDURE — 76705 ECHO EXAM OF ABDOMEN: CPT | Mod: 26

## 2023-01-23 RX ORDER — ATORVASTATIN CALCIUM 80 MG/1
10 TABLET, FILM COATED ORAL AT BEDTIME
Refills: 0 | Status: DISCONTINUED | OUTPATIENT
Start: 2023-01-23 | End: 2023-01-27

## 2023-01-23 RX ORDER — SENNA PLUS 8.6 MG/1
2 TABLET ORAL AT BEDTIME
Refills: 0 | Status: DISCONTINUED | OUTPATIENT
Start: 2023-01-23 | End: 2023-01-27

## 2023-01-23 RX ORDER — HEPARIN SODIUM 5000 [USP'U]/ML
1500 INJECTION INTRAVENOUS; SUBCUTANEOUS EVERY 6 HOURS
Refills: 0 | Status: DISCONTINUED | OUTPATIENT
Start: 2023-01-23 | End: 2023-01-24

## 2023-01-23 RX ORDER — POLYETHYLENE GLYCOL 3350 17 G/17G
17 POWDER, FOR SOLUTION ORAL DAILY
Refills: 0 | Status: DISCONTINUED | OUTPATIENT
Start: 2023-01-23 | End: 2023-01-27

## 2023-01-23 RX ORDER — HEPARIN SODIUM 5000 [USP'U]/ML
INJECTION INTRAVENOUS; SUBCUTANEOUS
Qty: 25000 | Refills: 0 | Status: DISCONTINUED | OUTPATIENT
Start: 2023-01-23 | End: 2023-01-24

## 2023-01-23 RX ORDER — MECLIZINE HCL 12.5 MG
25 TABLET ORAL DAILY
Refills: 0 | Status: DISCONTINUED | OUTPATIENT
Start: 2023-01-23 | End: 2023-01-27

## 2023-01-23 RX ORDER — METRONIDAZOLE 500 MG
500 TABLET ORAL ONCE
Refills: 0 | Status: COMPLETED | OUTPATIENT
Start: 2023-01-23 | End: 2023-01-23

## 2023-01-23 RX ORDER — CEFEPIME 1 G/1
500 INJECTION, POWDER, FOR SOLUTION INTRAMUSCULAR; INTRAVENOUS EVERY 12 HOURS
Refills: 0 | Status: DISCONTINUED | OUTPATIENT
Start: 2023-01-23 | End: 2023-01-26

## 2023-01-23 RX ORDER — MORPHINE SULFATE 50 MG/1
2 CAPSULE, EXTENDED RELEASE ORAL ONCE
Refills: 0 | Status: DISCONTINUED | OUTPATIENT
Start: 2023-01-23 | End: 2023-01-23

## 2023-01-23 RX ORDER — MEMANTINE HYDROCHLORIDE 10 MG/1
10 TABLET ORAL
Refills: 0 | Status: DISCONTINUED | OUTPATIENT
Start: 2023-01-23 | End: 2023-01-27

## 2023-01-23 RX ORDER — HEPARIN SODIUM 5000 [USP'U]/ML
3500 INJECTION INTRAVENOUS; SUBCUTANEOUS EVERY 6 HOURS
Refills: 0 | Status: DISCONTINUED | OUTPATIENT
Start: 2023-01-23 | End: 2023-01-24

## 2023-01-23 RX ORDER — PANTOPRAZOLE SODIUM 20 MG/1
40 TABLET, DELAYED RELEASE ORAL
Refills: 0 | Status: DISCONTINUED | OUTPATIENT
Start: 2023-01-23 | End: 2023-01-27

## 2023-01-23 RX ORDER — HYDROMORPHONE HYDROCHLORIDE 2 MG/ML
2 INJECTION INTRAMUSCULAR; INTRAVENOUS; SUBCUTANEOUS ONCE
Refills: 0 | Status: DISCONTINUED | OUTPATIENT
Start: 2023-01-23 | End: 2023-01-25

## 2023-01-23 RX ORDER — CEFEPIME 1 G/1
500 INJECTION, POWDER, FOR SOLUTION INTRAMUSCULAR; INTRAVENOUS ONCE
Refills: 0 | Status: COMPLETED | OUTPATIENT
Start: 2023-01-23 | End: 2023-01-23

## 2023-01-23 RX ORDER — HEPARIN SODIUM 5000 [USP'U]/ML
3500 INJECTION INTRAVENOUS; SUBCUTANEOUS ONCE
Refills: 0 | Status: COMPLETED | OUTPATIENT
Start: 2023-01-23 | End: 2023-01-23

## 2023-01-23 RX ORDER — APIXABAN 2.5 MG/1
5 TABLET, FILM COATED ORAL EVERY 12 HOURS
Refills: 0 | Status: DISCONTINUED | OUTPATIENT
Start: 2023-01-23 | End: 2023-01-23

## 2023-01-23 RX ORDER — APIXABAN 2.5 MG/1
2.5 TABLET, FILM COATED ORAL
Refills: 0 | Status: DISCONTINUED | OUTPATIENT
Start: 2023-01-23 | End: 2023-01-23

## 2023-01-23 RX ADMIN — SENNA PLUS 2 TABLET(S): 8.6 TABLET ORAL at 21:40

## 2023-01-23 RX ADMIN — MORPHINE SULFATE 2 MILLIGRAM(S): 50 CAPSULE, EXTENDED RELEASE ORAL at 03:59

## 2023-01-23 RX ADMIN — MEMANTINE HYDROCHLORIDE 10 MILLIGRAM(S): 10 TABLET ORAL at 06:21

## 2023-01-23 RX ADMIN — POLYETHYLENE GLYCOL 3350 17 GRAM(S): 17 POWDER, FOR SOLUTION ORAL at 15:29

## 2023-01-23 RX ADMIN — ATORVASTATIN CALCIUM 10 MILLIGRAM(S): 80 TABLET, FILM COATED ORAL at 21:40

## 2023-01-23 RX ADMIN — APIXABAN 5 MILLIGRAM(S): 2.5 TABLET, FILM COATED ORAL at 06:21

## 2023-01-23 RX ADMIN — Medication 100 MILLIGRAM(S): at 04:11

## 2023-01-23 RX ADMIN — CEFEPIME 100 MILLIGRAM(S): 1 INJECTION, POWDER, FOR SOLUTION INTRAMUSCULAR; INTRAVENOUS at 18:45

## 2023-01-23 RX ADMIN — HEPARIN SODIUM 800 UNIT(S)/HR: 5000 INJECTION INTRAVENOUS; SUBCUTANEOUS at 21:01

## 2023-01-23 RX ADMIN — CEFEPIME 100 MILLIGRAM(S): 1 INJECTION, POWDER, FOR SOLUTION INTRAMUSCULAR; INTRAVENOUS at 04:11

## 2023-01-23 RX ADMIN — HEPARIN SODIUM 3500 UNIT(S): 5000 INJECTION INTRAVENOUS; SUBCUTANEOUS at 21:01

## 2023-01-23 RX ADMIN — MEMANTINE HYDROCHLORIDE 10 MILLIGRAM(S): 10 TABLET ORAL at 18:44

## 2023-01-23 NOTE — CONSULT NOTE ADULT - ASSESSMENT
IMPRESSION:  Acute on Chronic PE (persistent RLL PE with its segmental branch, new left upper lobe PE)  Moderate left pleural effusion  PESI score 163  GB cancer with diffuse mets  HO mastectomy  UTI  urinary retention      PLAN:      CNS: avoid sedation    HEENT: Oral care    PULMONARY:  HOB @ 45 degrees.  Aspiration precautions, wean oxygen, CT reviewed    CARDIOVASCULAR: avoid volume overload, MAP adequate, ECHO, trend CE    GI: GI prophylaxis.  Feeding.      RENAL:  Follow up lytes.  Correct as needed, accurate urine output    INFECTIOUS DISEASE: Follow up cultures, procalcitonin, Ucx, rocephin    HEMATOLOGICAL:  on eliquis, monitor CBC, LE duplex    ENDOCRINE:  Follow up FS.  Insulin protocol if needed.    MUSCULOSKELETAL: ambulate as tolerated    overall poor prognosis    Downgrade to floor                   IMPRESSION:  Acute on Chronic PE (persistent RLL PE with its segmental branch, new left upper lobe PE)  Failed Eliquis   Moderate left pleural effusion possibly malignant   GB cancer with diffuse mets  HO mastectomy  UTI  KARINA likely post renal with Urinary retention    PLAN:    CNS: Avoid depressants     HEENT: Oral care    PULMONARY:  HOB @ 45 degrees.  Aspiration precautions, wean oxygen, CT reviewed.      CARDIOVASCULAR:  Avoid volume overload.  MAP adequate.  ECHO     GI: GI prophylaxis.  Feeding.      RENAL:  Follow up lytes.  Correct as needed, accurate urine output.  Logan for now.      INFECTIOUS DISEASE: Follow up cultures, procalcitonin, Ucx.  Rocephin for now     HEMATOLOGICAL:  DC Eliquis.  Will likely need to be DCed on LMWH once her kidney improved.  IV Heparin in PM.  LE duplex     ENDOCRINE:  Follow up FS.  Insulin protocol if needed.    MUSCULOSKELETAL: Bed rest until LE duplex     GOC     Overall poor prognosis    Downgrade to floor

## 2023-01-23 NOTE — CONSULT NOTE ADULT - PROBLEM SELECTOR RECOMMENDATION 5
- ongoing GOC  - no significant pain today, appeared comfortable, will monitor  ______________  Bharath Eden MD  Palliative Medicine  [facility]   of Geriatric and Palliative Medicine  [phone]

## 2023-01-23 NOTE — CONSULT NOTE ADULT - ASSESSMENT
INCOMPLETE NOTE DO NOT FOLLOW RECS    # New L sided pulmonary embolus  # R sided pulmonary embolus  # Venous thromboembolism in the setting of cancer  -< from: CT Angio Chest PE Protocol w/ IV Cont (01.23.23 @ 01:10) >  Since 1/11/2023,  Again seen large emboli in the right lower lobe segmental and   subsegmental pulmonary artery branches, no significant change.  New left upper lobe segmental pulmonary embolus.  < end of copied text >  -was on Lovenox last admission during treatment of initial R sided PE/ switched to eliquis on day of discharge/ was then held for 3 days in preparation of liver biopsy  -supposed to start eliquis day after biopsy done/ compliant?  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   -Last admission, team explained the treatment options will depend on confirmation pathology  -s/p o/p IR guided biopsy 1/18/23/ pending pathology results  -team explained that in the frail patient does supportive measures are reasonable otherwise options may include single agent chemotherapy such as gemcitabine and capecitabine, possible immunotherapy or possible targeted therapy which a different toxicity profile response rates but in general I explained to the patient and the family prognosis is approximately 10 to 12 months at best with treatment without treatment likely 2 to 6 months at best  -Pt will follow-up as outpatient depending on all the ancillary studies and her performance status will decide on treatment plan    # Pain due to malignancy  -palliative care management    INCOMPLETE NOTE DO NOT FOLLOW RECS   INCOMPLETE NOTE DO NOT FOLLOW RECS    # New L sided pulmonary embolus  # R sided pulmonary embolus  # Venous thromboembolism in the setting of cancer  -< from: CT Angio Chest PE Protocol w/ IV Cont (01.23.23 @ 01:10) >  Since 1/11/2023,  Again seen large emboli in the right lower lobe segmental and   subsegmental pulmonary artery branches, no significant change.  New left upper lobe segmental pulmonary embolus.  < end of copied text >  -was on Lovenox last admission during treatment of initial R sided PE/ switched to eliquis on day of discharge/ was then held for 3 days in preparation of liver biopsy  -supposed to start eliquis day after biopsy done/ compliant?  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   -Last admission, team explained the treatment options will depend on confirmation pathology  -s/p o/p IR guided biopsy 1/18/23/ pending pathology results  -team explained that in the frail patient does supportive measures are reasonable otherwise options may include single agent chemotherapy such as gemcitabine and capecitabine, possible immunotherapy or possible targeted therapy which a different toxicity profile response rates but in general I explained to the patient and the family prognosis is approximately 10 to 12 months at best with treatment without treatment likely 2 to 6 months at best  -Pt will follow-up as outpatient depending on all the ancillary studies and her performance status will decide on treatment plan    # Pain due to malignancy  -palliative care management    INCOMPLETE NOTE DO NOT FOLLOW RECS   Heme/onc consulted this admission to evaluate whether pt had VTE treatment failure with eliquis.   Pt was on on lovenox from 1/11-1/12/23 then on eliquis from 1/13-1/14/23. Pt was off AC for at least 3 days for preparation of liver biopsy on 18th. Per family pt has only been on AC for the past 3-4 days (post biopsy) so that is an additional day pt was not on eliquis. It is possible pt developed new PE while not on AC during this period. It is too early to call this treatment failure. Would recommend continuing eliquis and continue to monitor pt for worsening respiratory status, evaluation of PE with echo r/o RHS. Provide supportive care for now. Hospice and palliative should be on board d/t pt's worsening functional status and worsening medical issues.    Impression  very likely gallbladder carcinoma with mets (liver/ now with vertebral)  bilateral pulmonary embolism iso of cancer/hypercoagulable state  inadequate initial Anticoagulation treatment    # New L sided pulmonary embolus  # R sided pulmonary embolus  # Venous thromboembolism in the setting of cancer  -< from: CT Angio Chest PE Protocol w/ IV Cont (01.23.23 @ 01:10) >  Since 1/11/2023,  Again seen large emboli in the right lower lobe segmental and   subsegmental pulmonary artery branches, no significant change.  New left upper lobe segmental pulmonary embolus.  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   -Last admission, team explained the treatment options will depend on confirmation pathology  -s/p o/p IR guided biopsy 1/18/23/ pending pathology results  -team explained that in the frail patient does supportive measures are reasonable otherwise options may include single agent chemotherapy such as gemcitabine and capecitabine, possible immunotherapy or possible targeted therapy which a different toxicity profile response rates but in general I explained to the patient and the family prognosis is approximately 10 to 12 months at best with treatment without treatment likely 2 to 6 months at best  -Pt will follow-up as outpatient depending on all the ancillary studies and her performance status will decide on treatment plan    # Pain due to malignancy  -palliative care management    *Plan discussed with Hem/onc attending Dr. Moore*   Heme/onc consulted this admission to evaluate whether pt had VTE treatment failure with eliquis.   Pt was on on lovenox from 1/11-1/12/23 then on eliquis from 1/13-1/14/23. Pt was off AC for at least 3 days for preparation of liver biopsy on 18th. Per family pt has only been on AC for the past 3-4 days (post biopsy) so that is an additional day pt was not on eliquis. It is possible pt developed new PE while not on AC during this period. It is too early to call this treatment failure. Would recommend continuing eliquis and continue to monitor pt for worsening respiratory status, evaluation of PE with echo r/o RHS. Provide supportive care for now. Hospice and palliative should be on board d/t pt's worsening functional status and worsening medical issues.    Impression  very likely gallbladder carcinoma with mets (liver/ now with vertebral)  bilateral pulmonary embolism iso of cancer/hypercoagulable state  inadequate initial Anticoagulation treatment    # New L sided pulmonary embolus  # R sided pulmonary embolus  # Venous thromboembolism in the setting of cancer  -< from: CT Angio Chest PE Protocol w/ IV Cont (01.23.23 @ 01:10) >  Since 1/11/2023,  Again seen large emboli in the right lower lobe segmental and   subsegmental pulmonary artery branches, no significant change.  New left upper lobe segmental pulmonary embolus.  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 Overall from her condition she is hospice appropriate  -Pt will follow-up as outpatient depending on all the ancillary studies and her performance status will decide on treatment plan    # Pain due to malignancy  -palliative care management    For questions call [phone] or text via MS teams

## 2023-01-23 NOTE — CONSULT NOTE ADULT - SUBJECTIVE AND OBJECTIVE BOX
Patient is a 82y old  Female who presents with a chief complaint of     HPI:     81 yo female with a pmh of GB cancer with Liver mets, breast cancer s/p mastectomy, HTN, HLD presents for hypoxia. pt accompanied by daughter and son-in-law who states pt looked weak and when they checked her pulse ox it was 88%. family has also noted edema to BLE and abdomen since her cancer diagnosis. no fevers, chill, headache, recent illness/travel, cough, chest pain        PAST MEDICAL & SURGICAL HISTORY:  Gallbladder neoplasm      Liver metastases      Hypertension      S/P mastectomy          SOCIAL HX:   ex smoker                           FAMILY HISTORY:  No pertinent family history in first degree relatives    :  No known cardiovacular family hisotry     Review Of Systems:     All ROS are negative except per HPI       Allergies    aspirin (Unknown)  penicillin (Unknown)    Intolerances          PHYSICAL EXAM    ICU Vital Signs Last 24 Hrs  T(C): 36 (2023 19:17), Max: 36 (2023 19:17)  T(F): 96.8 (2023 19:17), Max: 96.8 (2023 19:17)  HR: 98 (2023 07:26) (81 - 100)  BP: 97/54 (2023 07:26) (97/54 - 115/56)  BP(mean): --  ABP: --  ABP(mean): --  RR: 16 (2023 06:17) (16 - 16)  SpO2: 98% (2023 07:26) (98% - 99%)    O2 Parameters below as of 2023 06:17    O2 Flow (L/min): 4          CONSTITUTIONAL:  thin appearing  not in distress    ENT:   Airway patent,   Mouth with normal mucosa.     CARDIAC:   Normal rate,   Regular rhythm.    edema+      RESPIRATORY:   No wheezing  Bilateral BS   Not tachypneic,  No use of accessory muscles    GASTROINTESTINAL:  Abdomen soft,   Non-tender,   No guarding,   + BS      NEUROLOGICAL:   Alert and oriented   No motor deficits.    SKIN:   Skin normal color for race,   No evidence of rash.                LABS:                          8.2    13.78 )-----------( 305      ( 2023 05:38 )             25.9                                               01-22    139  |  104  |  98<HH>  ----------------------------<  136<H>  4.9   |  22  |  2.4<H>    Ca    8.2<L>      2023 21:23    TPro  5.3<L>  /  Alb  2.5<L>  /  TBili  3.9<H>  /  DBili  2.8<H>  /  AST  271<H>  /  ALT  124<H>  /  AlkPhos  695<H>                                               Urinalysis Basic - ( 2023 04:10 )    Color: Gabriela / Appearance: Turbid / S.022 / pH: x  Gluc: x / Ketone: Negative  / Bili: Negative / Urobili: 3 mg/dL   Blood: x / Protein: 30 mg/dL / Nitrite: Negative   Leuk Esterase: Large / RBC: 178 /HPF / WBC >720 /HPF   Sq Epi: x / Non Sq Epi: 3 /HPF / Bacteria: Many        CARDIAC MARKERS ( 2023 21:23 )  x     / 0.05 ng/mL / x     / x     / x                                                LIVER FUNCTIONS - ( 2023 21:23 )  Alb: 2.5 g/dL / Pro: 5.3 g/dL / ALK PHOS: 695 U/L / ALT: 124 U/L / AST: 271 U/L / GGT: x                                                                                                                                       X-Rays              clear                                                              ECHO not available      MEDICATIONS  (STANDING):  apixaban 5 milliGRAM(s) Oral every 12 hours  atorvastatin 10 milliGRAM(s) Oral at bedtime  memantine 10 milliGRAM(s) Oral two times a day  polyethylene glycol 3350 17 Gram(s) Oral daily  senna 2 Tablet(s) Oral at bedtime    MEDICATIONS  (PRN):  aluminum hydroxide/magnesium hydroxide/simethicone Suspension 30 milliLiter(s) Oral every 4 hours PRN Dyspepsia  bisacodyl 5 milliGRAM(s) Oral at bedtime PRN Constipation  meclizine 25 milliGRAM(s) Oral daily PRN Dizziness

## 2023-01-23 NOTE — CONSULT NOTE ADULT - ATTENDING COMMENTS
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
seen/examined w/ hemonc team;note reviewed; case discussed  spoke to grandson at bedside    - geriatric patient (>80yo) with gall bladder mass, and lesions in liver. ECOG 3-4, rapidly deteriorating both performance status and labs (acute renal failure and worsening liver failure). S/p biopsy with pending results. Prognosis is poor. Please involve palliative care. Hospice would be an apporopriate option; I explained to a grandson that prognosis is poor; did not mention about hospice today as he revealed that the family is stressed out while dealing with placing his grandfather ( pt's ) in hospice.   - VTE. Would not claim there is a failure of treatment leading to a new event since pt has NOT been getting proper treatment with multiple interruptions for biopsy. I doubt other options such as lovenox (pt has acute renal failure, very frail with thin skin) or coumadin would be well tolerated; continue the current treatment

## 2023-01-23 NOTE — CONSULT NOTE ADULT - ASSESSMENT
82F with PMH of gallbladder cancer with liver mets, breast CA s/p mastectomy, HTN, HLD, here after recent treatment for PE, with dyspnea. Seen by palliative on last admission, no major symptoms, goals were for continued treatment at the time. Noted to have KARINA here, likely post-renal. Seen by pulmonary as well. Per grandson, patient also with AMS, slow to respond to them for 2 days. Palliative care consulted for ongoing GOC.

## 2023-01-23 NOTE — H&P ADULT - HISTORY OF PRESENT ILLNESS
82 year old Female known to have GB cancer with Liver mets, breast cancer s/p mastectomy, HTN, HLD, and discharged 1 week ago after being treated for PE and LLE was brought into the ER for shortness of breath.     82 year old Female known to have GB cancer with Liver mets, breast cancer s/p mastectomy, HTN, HLD, and discharged 1 week ago after being treated for PE and LLE was brought into the ER for sudden onset of  shortness of breath and low SpO2 of 88%.   History was provided by the patient's daughter (Nenita) who reports that her mother has been worsening since she got the liver biopsy on the 18th of January.  Patient had worsening Lower extremity edema, and increasing abdominal girth over the past 3 days associated with decreasing urine output and darker colored urine.  Also the daughter expressed concern that her mother oral intake has decreased.  No fever, no chill, no nausea, no vomiting , no diarrhea, no chest pain.    In ED, patient was found to be hypoxic and started on O2 4L/min by nasal canula with spO2 98%. HR: 98 BP: 97/54     CT-A chest showed Left pleural effusion, Old right sided PE and new left PE.  CT- abd+ pelvis showed mild ascites, moderate bilateral hydronephrosis with markedly distended urinary bladder, and diffuse hepatic metastases. Irregular thickened  gallbladder wall consistent with given history of gallbladder carcinoma.    Blood tests showed Leuckocytosis, macrtocytic anemia Hgb 82. .6, Venessa with creatinin of 2.6, elevated cholestatic LFTs, elevated trop and positive urinalysis.  A farris was inserted for urinary retention and patient was started on cefepime for UTI.    patient will be admitted for telemetry for treatement of Acute PE with tryptonemia and UTI with VENESSA and hydronephrosis.    GOC where also discussed with patient's daughter(Nenita, health care proxy) who understands the patient's poor general prognosis and confirms that the patient's code status is DNR and DNI, however she would like to give her mother a chance to get chemotherapy.

## 2023-01-23 NOTE — CONSULT NOTE ADULT - ASSESSMENT
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 She had recent IR guided Biopsy of Liver lesion on 1/18. Patient in ED son is present. Patient appears comfortable No current abdominal symptoms. Patient with Gallbladder cancer with Mets to Liver and new lesions now on the bones. Patient with advanced age, poor baseline functional status and significant Right sided PE with new left upper segmental PE. Jaundice is from Metastatic burden of the Liver and given advance disease with acute issues Endoscopic intervention would be of little benefit but carry significant risk. Would advise hospice whom appear to be on board.    Gallbladder Cancer  - Significant Mets to the Liver  - New foci on L2 and L3  - No Dilated ducts- so endoscopic drainage would be of no benefit   - Given age, advanced disease and multiple Mets sites along with B/L PE Endoscopic intervention is not a current option and would not benefit the patient   - Agree with hospice being on board

## 2023-01-23 NOTE — CONSULT NOTE ADULT - SUBJECTIVE AND OBJECTIVE BOX
HPI: 82F with PMH of gallbladder cancer with liver mets, breast CA s/p mastectomy, HTN, HLD, here after recent treatment for PE, with dyspnea. Seen by palliative on last admission, no major symptoms, goals were for continued treatment at the time. Noted to have KARINA here, likely post-renal. Seen by pulmonary as well. Per grandson, patient also with AMS, slow to respond to them for 2 days. Palliative care consulted for ongoing GOC.      PERTINENT PM/SXH:   Gallbladder neoplasm    Liver metastases    Hypertension    Pulmonary embolism      S/P mastectomy      FAMILY HISTORY:  Cannot obtain from patient      ITEMS NOT CHECKED ARE NOT PRESENT    SOCIAL HISTORY:   Significant other/partner[ ]  Children[ X]  Buddhism/Spirituality:  Substance hx:  [ ]   Tobacco hx:  [ ]   Alcohol hx: [ ]   Living Situation: [X ]Home  [ ]Long term care  [ ]Rehab [ ]Other  Home Services: [ ] HHA [ ] Visting RN [ ] Hospice  Occupation:  Home Opioid hx:  [ ] Y [ ] N [ ] I-Stop Reference No:    ADVANCE DIRECTIVES:    [ ] Full Code [ ] DNR  MOLST  [ ]  Living Will  [ ]   DECISION MAKER(s):  [ ] Health Care Proxy(s)  [ ] Surrogate(s)  [ ] Guardian           Name(s): Phone Number(s):    BASELINE (I)ADL(s) (prior to admission):  Esmeralda: [ ]Total  [ ] Moderate [ ]Dependent  Palliative Performance Status Version 2:         %    http://npcrc.org/files/news/palliative_performance_scale_ppsv2.pdf    Allergies    aspirin (Unknown)  penicillin (Unknown)    Intolerances    MEDICATIONS  (STANDING):  atorvastatin 10 milliGRAM(s) Oral at bedtime  cefepime   IVPB 500 milliGRAM(s) IV Intermittent every 12 hours  memantine 10 milliGRAM(s) Oral two times a day  pantoprazole    Tablet 40 milliGRAM(s) Oral before breakfast  polyethylene glycol 3350 17 Gram(s) Oral daily  senna 2 Tablet(s) Oral at bedtime    MEDICATIONS  (PRN):  aluminum hydroxide/magnesium hydroxide/simethicone Suspension 30 milliLiter(s) Oral every 4 hours PRN Dyspepsia  bisacodyl 5 milliGRAM(s) Oral at bedtime PRN Constipation  meclizine 25 milliGRAM(s) Oral daily PRN Dizziness    PRESENT SYMPTOMS: [X ]Unable to obtain due to poor mentation   Source if other than patient:  [ ]Family   [ ]Team     Pain: [ ]yes [ ]no  QOL impact -   Location -                    Aggravating factors -  Quality -  Radiation -  Timing-  Severity (0-10 scale):  Minimal acceptable level (0-10 scale):     CPOT:    https://www.[facility].org/getattachment/[email or web address]-[email or web address]-9n2l-7178x1599t7k/Critical-Care-Pain-Observation-Tool-(CPOT)      PAIN AD Score:     http://geriatrictoolkit.[email or web address]/cog/painad.pdf (press ctrl +  left click to view)    Dyspnea:                           [ ]Mild [ ]Moderate [ ]Severe  Anxiety:                             [ ]Mild [ ]Moderate [ ]Severe  Fatigue:                             [ ]Mild [ ]Moderate [ ]Severe  Nausea:                             [ ]Mild [ ]Moderate [ ]Severe  Loss of appetite:              [ ]Mild [ ]Moderate [ ]Severe  Constipation:                    [ ]Mild [ ]Moderate [ ]Severe    Other Symptoms:  [ ]All other review of systems negative     Palliative Performance Status Version 2:         %    http://npcrc.org/files/news/palliative_performance_scale_ppsv2.pdf  PHYSICAL EXAM:  Vital Signs Last 24 Hrs  T(C): 36 (2023 19:17), Max: 36 (2023 19:17)  T(F): 96.8 (2023 19:17), Max: 96.8 (2023 19:17)  HR: 92 (2023 16:10) (81 - 100)  BP: 102/50 (2023 16:10) (97/54 - 115/56)  BP(mean): --  RR: 18 (2023 16:10) (16 - 18)  SpO2: 97% (2023 16:10) (97% - 99%)    Parameters below as of 2023 06:17    O2 Flow (L/min): 4   I&O's Summary      GENERAL:  [X ] No acute distress [ ]Lethargic  [ ]Unarousable  [ ]Verbal  [ ]Non-Verbal [ ]Cachexia    BEHAVIORAL/PSYCH:  [ ]Alert and Oriented x  [ ] Anxiety [ ] Delirium [ ] Agitation [ X] Calm   EYES: [ ] No scleral icterus [ ] Scleral icterus [X ] Closed  ENMT:  [ ]Dry mouth  [ ]No oral lesions [X ] No external ear or nose lesions  CARDIOVASCULAR:  [ ]Regular [ ]Irregular [ ]Tachy [ ]Not Tachy  [ ]Ulises [ ] Edema  PULMONARY:  [ ]Tachypnea  [ ]Audible excessive secretions [ X] No labored breathing [ ] labored breathing  GASTROINTESTINAL: [ ]Soft  [ ]Distended  [ X] Not distended [ ]Non tender [ ]Tender    MUSCULOSKELETAL: [ ]No clubbing [ ] clubbing  [ ] No cyanosis [ ] cyanosis  NEUROLOGIC: [ ]No focal deficits [ ] Follows commands [ ] Does not follow commands  [X ]Cognitive impairment  [ ]Dysphagia [ ]Dysarthria [ ]Paresis   GENITOURINARY: [ ]Normal [ ] Incontinent   [ ]Oliguria/Anuria   [ ]Logan  SKIN: [ ] Jaundiced [ X] Non-jaundiced [ ]Rash [ ]No Rash [ ] Warm [ ] Dry  MISC/LINES: [ ] ET tube [ ] Trach [ ]NGT/OGT [ ]PEG [ ]Logan    CRITICAL CARE:  [ ] Shock Present  [ ]Septic [ ]Cardiogenic [ ]Neurologic [ ]Hypovolemic  [ ]  Vasopressors [ ]  Inotropes   [ ]Respiratory failure present [ ]Mechanical ventilation [ ]Non-invasive ventilatory support [ ]High flow  [ ]Acute  [ ]Chronic [ ]Hypoxic  [ ]Hypercarbic [ ]Other  [ ]Other organ failure     LABS: reviewed                        8.2    13.78 )-----------( 305      ( 2023 05:38 )             25.9       139  |  106  |  105<HH>  ----------------------------<  123<H>  5.2<H>   |  18  |  2.6<H>    Ca    8.1<L>      2023 05:38  Mg     2.7         TPro  4.9<L>  /  Alb  2.5<L>  /  TBili  4.5<H>  /  DBili  x   /  AST  310<H>  /  ALT  135<H>  /  AlkPhos  679<H>        Urinalysis Basic - ( 2023 04:10 )    Color: Gabriela / Appearance: Turbid / S.022 / pH: x  Gluc: x / Ketone: Negative  / Bili: Negative / Urobili: 3 mg/dL   Blood: x / Protein: 30 mg/dL / Nitrite: Negative   Leuk Esterase: Large / RBC: 178 /HPF / WBC >720 /HPF   Sq Epi: x / Non Sq Epi: 3 /HPF / Bacteria: Many      RADIOLOGY & ADDITIONAL STUDIES:  CT head  No evidence of intracranial hemorrhage, territorial infarct, or mass   effect.    PROTEIN CALORIE MALNUTRITION PRESENT: [ ]mild [ ]moderate [ ]severe [ ]underweight [ ]morbid obesity  https://www.andeal.org/vault/2440/web/files/ONC/Table_Clinical%20Characteristics%20to%20Document%20Malnutrition-White%20JV%20et%20al%2020.pdf    Height (cm): 160 (23 @ 19:17), 160 (23 @ 18:00)  Weight (kg): 54.5 (23 @ 18:00)  BMI (kg/m2): 21.3 (23 @ 19:17), 21.3 (23 @ 18:00)    [ ]PPSV2 < or = to 30% [ ]significant weight loss  [ ]poor nutritional intake  [ ]anasarca      [ ]Artificial Nutrition      REFERRALS:   [ ]Chaplaincy  [ ]Hospice  [ ]Child Life  [ ]Social Work  [ ]Case management [ ]Holistic Therapy     Goals of Care Document:     ______________  Bharath Eden MD  Palliative Medicine  [facility]   of Geriatric and Palliative Medicine  [phone]

## 2023-01-23 NOTE — ED ADULT NURSE REASSESSMENT NOTE - NS ED NURSE REASSESS COMMENT FT1
pt seen and assessed with  #[id number]. doctor at bedside. all concerns addressed with daughter at bedside.

## 2023-01-23 NOTE — HOSPICE CARE NOTE - CONVESATION DETAILS
Patient known to hospice as she was consulted on previous hospitalization and was determined that patient's current care needs exceeds what family can provide as both the patient and her spouse have terminal cancer and they both live with their daughter who works full time. . Further discussed home hospice philosophy and services with patient's daughter Quoc. Reinforced that we are a support service that visit 1 x a week at home. Quoc aware family must provide care when Medicaid HHA 's are not there, hospice does not provide daily visits or penitentiary care. Quoc verbalizes she does not want to refrain from brining the patient to the ER for medical attention should the need arise. Reinforced hospice's expectation is that the patient would be managed at home with no further visits to the hospital. Quoc requesting call back to discuss further with her  [place].

## 2023-01-23 NOTE — CONSULT NOTE ADULT - SUBJECTIVE AND OBJECTIVE BOX
Hematology/Oncology Consult Note    HPI:  82 year old Female known to have GB cancer with Liver mets, breast cancer s/p mastectomy, HTN, HLD, and discharged 1 week ago after being treated for PE and LLE was brought into the ER for sudden onset of  shortness of breath and low SpO2 of 88%.   History was provided by the patient's daughter (Nenita) who reports that her mother has been worsening since she got the liver biopsy on the 18th of January.  Patient had worsening Lower extremity edema, and increasing abdominal girth over the past 3 days associated with decreasing urine output and darker colored urine.  Also the daughter expressed concern that her mother oral intake has decreased.  No fever, no chill, no nausea, no vomiting , no diarrhea, no chest pain.    In ED, patient was found to be hypoxic and started on O2 4L/min by nasal canula with spO2 98%. HR: 98 BP: 97/54     CT-A chest showed Left pleural effusion, Old right sided PE and new left PE.  CT- abd+ pelvis showed mild ascites, moderate bilateral hydronephrosis with markedly distended urinary bladder, and diffuse hepatic metastases. Irregular thickened  gallbladder wall consistent with given history of gallbladder carcinoma.    Blood tests showed Leuckocytosis, macrtocytic anemia Hgb 82. .6, Venessa with creatinin of 2.6, elevated cholestatic LFTs, elevated trop and positive urinalysis.  A farris was inserted for urinary retention and patient was started on cefepime for UTI.    patient will be admitted for telemetry for treatement of Acute PE with tryptonemia and UTI with VENESSA and hydronephrosis.    GOC where also discussed with patient's daughter(Nenita, health care proxy) who understands the patient's poor general prognosis and confirms that the patient's code status is DNR and DNI, however she would like to give her mother a chance to get chemotherapy. (23 Jan 2023 08:43)      Oncology Hx:      Allergies    aspirin (Unknown)  penicillin (Unknown)    Intolerances        MEDICATIONS  (STANDING):  atorvastatin 10 milliGRAM(s) Oral at bedtime  cefepime   IVPB 500 milliGRAM(s) IV Intermittent every 12 hours  memantine 10 milliGRAM(s) Oral two times a day  pantoprazole    Tablet 40 milliGRAM(s) Oral before breakfast  polyethylene glycol 3350 17 Gram(s) Oral daily  senna 2 Tablet(s) Oral at bedtime    MEDICATIONS  (PRN):  aluminum hydroxide/magnesium hydroxide/simethicone Suspension 30 milliLiter(s) Oral every 4 hours PRN Dyspepsia  bisacodyl 5 milliGRAM(s) Oral at bedtime PRN Constipation  meclizine 25 milliGRAM(s) Oral daily PRN Dizziness      PAST MEDICAL & SURGICAL HISTORY:  Gallbladder neoplasm      Liver metastases      Hypertension      Pulmonary embolism      S/P mastectomy          FAMILY HISTORY:  No pertinent family history in first degree relatives        SOCIAL HISTORY: No EtOH, no tobacco    REVIEW OF SYSTEMS:    CONSTITUTIONAL: No weakness, fevers or chills  EYES/ENT: No visual changes;  No vertigo or throat pain   NECK: No pain or stiffness  RESPIRATORY: No cough, wheezing, hemoptysis; No shortness of breath  CARDIOVASCULAR: No chest pain or palpitations  GASTROINTESTINAL: No abdominal or epigastric pain. No nausea, vomiting, or hematemesis; No diarrhea or constipation. No melena or hematochezia.  GENITOURINARY: No dysuria, frequency or hematuria  NEUROLOGICAL: No numbness or weakness  SKIN: No itching, burning, rashes, or lesions   All other review of systems is negative unless indicated above.    Height (cm): 160 (01-22 @ 19:17)    T(F): 96.8 (01-22-23 @ 19:17), Max: 96.8 (01-22-23 @ 19:17)  HR: 98 (01-23-23 @ 07:26)  BP: 97/54 (01-23-23 @ 07:26)  RR: 16 (01-23-23 @ 06:17)  SpO2: 98% (01-23-23 @ 07:26)  Wt(kg): --    GENERAL: NAD, well-developed  HEAD:  Atraumatic, Normocephalic  EYES: EOMI, PERRLA, conjunctiva and sclera clear  NECK: Supple, No JVD  CHEST/LUNG: Clear to auscultation bilaterally; No wheeze  HEART: Regular rate and rhythm; No murmurs, rubs, or gallops  ABDOMEN: Soft, Nontender, Nondistended; Bowel sounds present  EXTREMITIES:  2+ Peripheral Pulses, No clubbing, cyanosis, or edema  NEUROLOGY: non-focal  SKIN: No rashes or lesions                          8.2    13.78 )-----------( 305      ( 23 Jan 2023 05:38 )             25.9       01-23    139  |  106  |  105<HH>  ----------------------------<  123<H>  5.2<H>   |  18  |  2.6<H>    Ca    8.1<L>      23 Jan 2023 05:38  Mg     2.7     01-23    TPro  4.9<L>  /  Alb  2.5<L>  /  TBili  4.5<H>  /  DBili  x   /  AST  310<H>  /  ALT  135<H>  /  AlkPhos  679<H>  01-23      Magnesium, Serum: 2.7 mg/dL (01-23 @ 05:38)       Hematology/Oncology Consult Note    HPI:  82 year old Female known to have GB cancer with Liver mets, breast cancer s/p mastectomy, HTN, HLD, and discharged 1 week ago after being treated for PE and LLE was brought into the ER for sudden onset of  shortness of breath and low SpO2 of 88%.   History was provided by the patient's daughter (Nenita) who reports that her mother has been worsening since she got the liver biopsy on the 18th of January.  Patient had worsening Lower extremity edema, and increasing abdominal girth over the past 3 days associated with decreasing urine output and darker colored urine.  Also the daughter expressed concern that her mother oral intake has decreased.  No fever, no chill, no nausea, no vomiting , no diarrhea, no chest pain.    In ED, patient was found to be hypoxic and started on O2 4L/min by nasal canula with spO2 98%. HR: 98 BP: 97/54     CT-A chest showed Left pleural effusion, Old right sided PE and new left PE.  CT- abd+ pelvis showed mild ascites, moderate bilateral hydronephrosis with markedly distended urinary bladder, and diffuse hepatic metastases. Irregular thickened  gallbladder wall consistent with given history of gallbladder carcinoma.    Blood tests showed Leuckocytosis, macrtocytic anemia Hgb 82. .6, Venessa with creatinin of 2.6, elevated cholestatic LFTs, elevated trop and positive urinalysis.  A farris was inserted for urinary retention and patient was started on cefepime for UTI.    patient will be admitted for telemetry for treatement of Acute PE with tryptonemia and UTI with VENESSA and hydronephrosis.    GOC where also discussed with patient's daughter(Nenita, health care proxy) who understands the patient's poor general prognosis and confirms that the patient's code status is DNR and DNI, however she would like to give her mother a chance to get chemotherapy. (23 Jan 2023 08:43)      Oncology Hx:  Pt seen by Dr. Donato last admission at [facility]. Had recommended o/p f/u and oncology management pending biopsy results and pt's functional status.   Heme/onc consulted this admission to evaluate whether pt had VTE treatment failure with eliquis.   Pt was on on lovenox from 1/11-1/12/23 then on eliquis from 1/13-1/14/23. Pt was off AC for atleast 3 days for preparation of liver biopsy on 18th. Per family pt has only been on AC for the past 3-4 days so that is an additional day pt was not on eliquis. It is possible pt developed new PE while not on AC during this period. It is too early to call this treatment failure.      Allergies    aspirin (Unknown)  penicillin (Unknown)    Intolerances        MEDICATIONS  (STANDING):  atorvastatin 10 milliGRAM(s) Oral at bedtime  cefepime   IVPB 500 milliGRAM(s) IV Intermittent every 12 hours  memantine 10 milliGRAM(s) Oral two times a day  pantoprazole    Tablet 40 milliGRAM(s) Oral before breakfast  polyethylene glycol 3350 17 Gram(s) Oral daily  senna 2 Tablet(s) Oral at bedtime    MEDICATIONS  (PRN):  aluminum hydroxide/magnesium hydroxide/simethicone Suspension 30 milliLiter(s) Oral every 4 hours PRN Dyspepsia  bisacodyl 5 milliGRAM(s) Oral at bedtime PRN Constipation  meclizine 25 milliGRAM(s) Oral daily PRN Dizziness      PAST MEDICAL & SURGICAL HISTORY:  Gallbladder neoplasm      Liver metastases      Hypertension      Pulmonary embolism      S/P mastectomy          FAMILY HISTORY:  No pertinent family history in first degree relatives        SOCIAL HISTORY: No EtOH, no tobacco    REVIEW OF SYSTEMS:    CONSTITUTIONAL: No weakness, fevers or chills  EYES/ENT: No visual changes;  No vertigo or throat pain   NECK: No pain or stiffness  RESPIRATORY: No cough, wheezing, hemoptysis; No shortness of breath  CARDIOVASCULAR: No chest pain or palpitations  GASTROINTESTINAL: No abdominal or epigastric pain. No nausea, vomiting, or hematemesis; No diarrhea or constipation. No melena or hematochezia.  GENITOURINARY: No dysuria, frequency or hematuria  NEUROLOGICAL: No numbness or weakness  SKIN: No itching, burning, rashes, or lesions   All other review of systems is negative unless indicated above.    Height (cm): 160 (01-22 @ 19:17)    T(F): 96.8 (01-22-23 @ 19:17), Max: 96.8 (01-22-23 @ 19:17)  HR: 98 (01-23-23 @ 07:26)  BP: 97/54 (01-23-23 @ 07:26)  RR: 16 (01-23-23 @ 06:17)  SpO2: 98% (01-23-23 @ 07:26)  Wt(kg): --    GENERAL: NAD, well-developed  HEAD:  Atraumatic, Normocephalic  EYES: EOMI, PERRLA, conjunctiva and sclera clear  NECK: Supple, No JVD  CHEST/LUNG: Clear to auscultation bilaterally; No wheeze  HEART: Regular rate and rhythm; No murmurs, rubs, or gallops  ABDOMEN: Soft, Nontender, Nondistended; Bowel sounds present  EXTREMITIES:  2+ Peripheral Pulses, No clubbing, cyanosis, or edema  NEUROLOGY: non-focal  SKIN: No rashes or lesions                          8.2    13.78 )-----------( 305      ( 23 Jan 2023 05:38 )             25.9       01-23    139  |  106  |  105<HH>  ----------------------------<  123<H>  5.2<H>   |  18  |  2.6<H>    Ca    8.1<L>      23 Jan 2023 05:38  Mg     2.7     01-23    TPro  4.9<L>  /  Alb  2.5<L>  /  TBili  4.5<H>  /  DBili  x   /  AST  310<H>  /  ALT  135<H>  /  AlkPhos  679<H>  01-23      Magnesium, Serum: 2.7 mg/dL (01-23 @ 05:38)

## 2023-01-23 NOTE — HOSPICE CARE NOTE - CONVESATION DETAILS
Complicated D/C plan. Please call hospice intake RN Genevieve linton for further discussion at x [phone]

## 2023-01-23 NOTE — H&P ADULT - NSHPPHYSICALEXAM_GEN_ALL_CORE
GENERAL: NAD, well-groomed, well-developed  HEAD:  Atraumatic, Normocephalic  EYES: EOMI, PERRLA, conjunctiva and sclera clear  ENMT: No tonsillar erythema, exudates, or enlargement; Moist mucous membranes  NECK: Supple, No JVD, Normal thyroid  NERVOUS SYSTEM:  Alert & Oriented X3, Good concentration; Motor Strength 5/5 B/L upper and lower extremities  CHEST/LUNG: Clear to percussion bilaterally; No rales, rhonchi, wheezing, or rubs  HEART: Regular rate and rhythm; No murmurs, rubs, or gallops  ABDOMEN: Soft, Nontender, Nondistended; Bowel sounds present  EXTREMITIES:  2+ Peripheral Pulses, No clubbing, cyanosis, +b/l LE edema GENERAL: NAD, well-groomed, well-developed  HEAD:  Atraumatic, Normocephalic  EYES: jaundice  ENMT: No tonsillar erythema, exudates, or enlargement; Moist mucous membranes  NECK: Supple, No JVD, Normal thyroid  CHEST/LUNG: decreased breath sounds over left lung  HEART: Regular rate and rhythm; No murmurs, rubs, or gallops  ABDOMEN: Soft, Nontender, Bowel sounds present, = shifting dullness  EXTREMITIES:  2+ Peripheral Pulses, +2 b/l pitting LE edema

## 2023-01-23 NOTE — H&P ADULT - CONVERSATION DETAILS
I discussed with Nenita, the patient daughter and health carte proxy the diagnoses, medical problems and general poor prognosis of the patient.  She understood that the patient has advanced cancer and is currently admitted for multiple issues.  She acknowledges that her mother medical problems carry a poor outcome and that she does not want her mother to suffer but would like to give her a chance with chemotherapy.    I explained to Nenita that unless otherwise stated, in a hospital everyone is treated as full code, meaning we do everything to keep them alive such as compressions when a pt's heart does not beat on its own and intubation and ventilation when a patient cannot breathe on their own. Asked whether this is something that lesly would want.  Nenita expressed that she does not want to prolong her mother's suffering. In the event that her heart or breathing stops, she does not want any interventions, tubes or compressions.  MOLST form was signed.  Code status DNR/DNI

## 2023-01-23 NOTE — CONSULT NOTE ADULT - SUBJECTIVE AND OBJECTIVE BOX
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 She had recent IR guided Biopsy of Liver lesion on 1/18.           PAST MEDICAL & SURGICAL HISTORY:  Gallbladder neoplasm  Liver metastases  Hypertension  Pulmonary embolism  S/P mastectomy      Social History  Denies Current Tobacco use  Denies Current ETOH use  Denies Current Illicit Drug use       Family Hx:  Father: Non Contributory   Mother: Non Contributory    MEDICATIONS  (STANDING):  atorvastatin 10 milliGRAM(s) Oral at bedtime  cefepime   IVPB 500 milliGRAM(s) IV Intermittent every 12 hours  memantine 10 milliGRAM(s) Oral two times a day  pantoprazole    Tablet 40 milliGRAM(s) Oral before breakfast  polyethylene glycol 3350 17 Gram(s) Oral daily  senna 2 Tablet(s) Oral at bedtime    MEDICATIONS  (PRN):  aluminum hydroxide/magnesium hydroxide/simethicone Suspension 30 milliLiter(s) Oral every 4 hours PRN Dyspepsia  bisacodyl 5 milliGRAM(s) Oral at bedtime PRN Constipation  meclizine 25 milliGRAM(s) Oral daily PRN Dizziness      Allergies  aspirin (Unknown)  penicillin (Unknown)    Review of Systems  General:  See HPI  HEENT: Denies Trouble Swallowing ,Denies  Sore Throat , Denies Change in hearing/vision/speech ,Denies Dizziness    Cardio: Denies  Chest Pain , Palpitations    Respiratory: See HPI  Abdomen: See detailed HPI  Neuro: Denies Headache Denies Dizziness, Denies Paresthesias  MSK: Denies pain in Bones/Joints/Muscles   Psych: Patient denies depression, denies suicidal or homicidal ideations  Integ: Patient Denies rash, or new skin lesions       Vital Signs Last 24 Hrs  T(C): 36 (22 Jan 2023 19:17), Max: 36 (22 Jan 2023 19:17)  T(F): 96.8 (22 Jan 2023 19:17), Max: 96.8 (22 Jan 2023 19:17)  HR: 98 (23 Jan 2023 07:26) (81 - 100)  BP: 97/54 (23 Jan 2023 07:26) (97/54 - 115/56)  BP(mean): --  RR: 16 (23 Jan 2023 06:17) (16 - 16)  SpO2: 98% (23 Jan 2023 07:26) (98% - 99%)    Parameters below as of 23 Jan 2023 06:17    O2 Flow (L/min): 4    Physical Exam  Gen: Frail, chronically ill appearing   Head: NC/AT  ENT: PERRLA, Sclera Icteric  Cardio: S1/S2 Tachy  Resp: Anterior CTA B/L, accessory muscle use, slight tachypnea   Abdomen: Soft, ND/NT  Extremities: FROM x 4  Skin: No jaundice, no excoriation       Labs:                       8.2    13.78 )-----------( 305      ( 23 Jan 2023 05:38 )             25.9       Auto Neutrophil %: 82.1 % (01-23-23 @ 05:38)  Auto Immature Granulocyte %: 2.9 % (01-23-23 @ 05:38)  Auto Neutrophil %: 80.2 % (01-22-23 @ 21:23)  Auto Immature Granulocyte %: 2.3 % (01-22-23 @ 21:23)    01-23    139  |  106  |  105<HH>  ----------------------------<  123<H>  5.2<H>   |  18  |  2.6<H>      Calcium, Total Serum: 8.1 mg/dL (01-23-23 @ 05:38)      LFTs:             4.9  | 4.5  | 310      ------------------[679     ( 23 Jan 2023 05:38 )  2.5  | x    | 135            Blood Gas Venous - Lactate: 1.70 mmol/L (01-22-23 @ 21:30)    Coags:    CARDIAC MARKERS ( 23 Jan 2023 05:38 )  x     / 0.05 ng/mL / x     / x     / x      CARDIAC MARKERS ( 22 Jan 2023 21:23 )  x     / 0.05 ng/mL / x     / x     / x          RADIOLOGY & ADDITIONAL STUDIES:  CT Abdomen and Pelvis w/ IV Cont 01.23.23   IMPRESSION:    Since 1/11/2023,    Again seen large emboli in the right lower lobe segmental and   subsegmental pulmonary artery branches, no significant change.    New left upper lobe segmental pulmonary embolus.    Moderate left pleural effusion with adjacent atelectasis.    New moderate bilateral hydronephrosis with markedly distended urinary   bladder, suspicious for urinary bladder retention.    Again seen irregular gallbladder wall thickening, diffuse hepatic   metastasis and retroperitoneal and periportal lymphadenopathy, consistent   with given history of gallbladder carcinoma.    Suspicious sclerotic foci within L2 and L3 vertebral bodies.   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 She had recent IR guided Biopsy of Liver lesion on 1/18. Patient in ED son is present. Patient appears comfortable No current abdominal symptoms.       PAST MEDICAL & SURGICAL HISTORY:  Gallbladder neoplasm  Liver metastases  Hypertension  Pulmonary embolism  S/P mastectomy      Social History  Denies Current Tobacco use  Denies Current ETOH use  Denies Current Illicit Drug use       Family Hx:  Father: Non Contributory   Mother: Non Contributory    MEDICATIONS  (STANDING):  atorvastatin 10 milliGRAM(s) Oral at bedtime  cefepime   IVPB 500 milliGRAM(s) IV Intermittent every 12 hours  memantine 10 milliGRAM(s) Oral two times a day  pantoprazole    Tablet 40 milliGRAM(s) Oral before breakfast  polyethylene glycol 3350 17 Gram(s) Oral daily  senna 2 Tablet(s) Oral at bedtime    MEDICATIONS  (PRN):  aluminum hydroxide/magnesium hydroxide/simethicone Suspension 30 milliLiter(s) Oral every 4 hours PRN Dyspepsia  bisacodyl 5 milliGRAM(s) Oral at bedtime PRN Constipation  meclizine 25 milliGRAM(s) Oral daily PRN Dizziness      Allergies  aspirin (Unknown)  penicillin (Unknown)    Review of Systems  General:  See HPI  HEENT: Denies Trouble Swallowing ,Denies  Sore Throat , Denies Change in hearing/vision/speech ,Denies Dizziness    Cardio: Denies  Chest Pain , Palpitations    Respiratory: See HPI  Abdomen: See detailed HPI  Neuro: Denies Headache Denies Dizziness, Denies Paresthesias  MSK: Denies pain in Bones/Joints/Muscles   Psych: Patient denies depression, denies suicidal or homicidal ideations  Integ: Patient Denies rash, or new skin lesions       Vital Signs Last 24 Hrs  T(C): 36 (22 Jan 2023 19:17), Max: 36 (22 Jan 2023 19:17)  T(F): 96.8 (22 Jan 2023 19:17), Max: 96.8 (22 Jan 2023 19:17)  HR: 98 (23 Jan 2023 07:26) (81 - 100)  BP: 97/54 (23 Jan 2023 07:26) (97/54 - 115/56)  BP(mean): --  RR: 16 (23 Jan 2023 06:17) (16 - 16)  SpO2: 98% (23 Jan 2023 07:26) (98% - 99%)    Parameters below as of 23 Jan 2023 06:17    O2 Flow (L/min): 4    Physical Exam  Gen: Frail, chronically ill appearing   Head: NC/AT  ENT: PERRLA, Sclera Icteric  Cardio: S1/S2 Tachy  Resp: Anterior CTA B/L, accessory muscle use, slight tachypnea   Abdomen: Soft, ND/NT  Extremities: FROM x 4  Skin: No jaundice, no excoriation       Labs:                       8.2    13.78 )-----------( 305      ( 23 Jan 2023 05:38 )             25.9       Auto Neutrophil %: 82.1 % (01-23-23 @ 05:38)  Auto Immature Granulocyte %: 2.9 % (01-23-23 @ 05:38)  Auto Neutrophil %: 80.2 % (01-22-23 @ 21:23)  Auto Immature Granulocyte %: 2.3 % (01-22-23 @ 21:23)    01-23    139  |  106  |  105<HH>  ----------------------------<  123<H>  5.2<H>   |  18  |  2.6<H>      Calcium, Total Serum: 8.1 mg/dL (01-23-23 @ 05:38)      LFTs:             4.9  | 4.5  | 310      ------------------[679     ( 23 Jan 2023 05:38 )  2.5  | x    | 135            Blood Gas Venous - Lactate: 1.70 mmol/L (01-22-23 @ 21:30)    Coags:    CARDIAC MARKERS ( 23 Jan 2023 05:38 )  x     / 0.05 ng/mL / x     / x     / x      CARDIAC MARKERS ( 22 Jan 2023 21:23 )  x     / 0.05 ng/mL / x     / x     / x          RADIOLOGY & ADDITIONAL STUDIES:  CT Abdomen and Pelvis w/ IV Cont 01.23.23   IMPRESSION:    Since 1/11/2023,    Again seen large emboli in the right lower lobe segmental and   subsegmental pulmonary artery branches, no significant change.    New left upper lobe segmental pulmonary embolus.    Moderate left pleural effusion with adjacent atelectasis.    New moderate bilateral hydronephrosis with markedly distended urinary   bladder, suspicious for urinary bladder retention.    Again seen irregular gallbladder wall thickening, diffuse hepatic   metastasis and retroperitoneal and periportal lymphadenopathy, consistent   with given history of gallbladder carcinoma.    Suspicious sclerotic foci within L2 and L3 vertebral bodies.

## 2023-01-23 NOTE — H&P ADULT - ATTENDING COMMENTS
My note supersedes all residents notes that I sign, My correction for their notes are in my notes   the patient seen and examined by me, discussed with house staff   patient daughter at bedside   Mandarin (  # [id number] Serenety )  as per the patient daughter, the patient presented for sob, desaturation to 88% o2 , she has been having also worsening abd distention and leg edema   she also reports that the patient getting more weak, does not eat good and her urine is less and dark   On exam General awake, alert NAD, Lungs clear to ausculations b/l, Heart regular ryhthm, Abdomen: soft, non tender + distended, +Farris with dark urine , Ext ++ b/l LE edema   Labs / EKG/ imaging reviewed by me     82 year old Female known to have GB cancer with Liver mets, breast cancer s/p mastectomy, HTN, HLD, and discharged 1 week ago after being treated for PE and LLE was brought into the ER for shortness of breath.    []Acute hypoxic respiratory failure in the setting of Acute on chronic PE ( New left PE and old R PE  ), Left pleural effusion   []KARINA on Chronic kidney disease 3 , Likley obstructive/ b/l Hydronephrosis   []UTI   []Possible vertebral mets   []Abnormal liver function in the setting of hx of GB cancer with liver mets  s/p liver biopsy 1/18 with positive malignant cells   []Obstructive jaundice  []Abd distension / ascites   []Lower ext edema b/l   []Macrocytic anemia   []Hyperkalemia    unknown compliance or failure of eliquis   would start heparin ggt and consult Hem/Onc for AG   for now monitor aptt and adjust the  heparin as per protocol   - c/w cefepime , - f/u on urine culture and blood culture  - continue with farris for now , montior I/O and avoid nephrotoxic meds and renally doses the home meds   hold lasix today , hold losratan as well   - repeat creatinine   consult nephrology if still no improvement after farris   - US kidney , US Abdomen for asictits and RUQ   - get bilat LE venous duplex  get Oncology consult   GI consult for possible stent   Palliative and hospice consult   daily liver and INR labs   critical care note appreciated   repeat BMP for K as well   check anemia work up   keep type and screen active       [] elevated Troponin  - trop 0.05 stable on 2 sets  no active chest pain   - trop Elevation liklely 2/2 to PE and KARINA   - Check TTE  - repeat trop  cardiac monitor           GOC, Discussed with the patient daughter at bedside - Patient daughter confirmed DNR/DNI but she is interested to try cancer treatment     DVT ppx - on full AG     very poor prognosis My note supersedes all residents notes that I sign, My correction for their notes are in my notes   the patient seen and examined by me, discussed with house staff   patient daughter at bedside   Mandarin (  # [id number] Serenety )  as per the patient daughter, the patient presented for sob, desaturation to 88% o2 , she has been having also worsening abd distention and leg edema   she also reports that the patient getting more weak, does not eat good and her urine is less and dark   On exam General awake, alert NAD, Lungs clear to ausculations b/l, Heart regular ryhthm, Abdomen: soft, non tender + distended, +Farris with dark urine , Ext ++ b/l LE edema   Labs / EKG/ imaging reviewed by me     82 year old Female known to have GB cancer with Liver mets, breast cancer s/p mastectomy, HTN, HLD, and discharged 1 week ago after being treated for PE and LLE was brought into the ER for shortness of breath.    []Acute hypoxic respiratory failure in the setting of Acute on chronic PE ( New left PE and old R PE  ), Left pleural effusion   []KARINA on Chronic kidney disease 3 , Liv[place] obstructive/ b/l Hydronephrosis   []UTI   []Possible vertebral mets   []Abnormal liver function in the setting of hx of GB cancer with liver mets  s/p liver biopsy 1/18 with positive malignant cells   []Obstructive jaundice  []Abd distension / ascites   []Lower ext edema b/l   []Macrocytic anemia   []Hyperkalemia    unknown compliance or failure of eliquis   would start heparin ggt and consult Hem/Onc for AG   for now monitor aptt and adjust the  heparin as per protocol   - c/w cefepime , - f/u on urine culture and blood culture  - continue with farris for now , montior I/O and avoid nephrotoxic meds and renally doses the home meds   hold lasix today , hold losratan as well   - repeat creatinine   consult nephrology if still no improvement after farris   - US kidney , US Abdomen for asictits and RUQ   - get bilat LE venous duplex  get Oncology consult   GI consult for possible stent   Palliative and hospice consult   daily liver and INR labs   critical care note appreciated   repeat BMP for K as well   check anemia work up   keep type and screen active   monitor resp status and oxygen requirement - now on NC       [] elevated Troponin  - trop 0.05 stable on 2 sets  no active chest pain   - trop Elevation likgamally 2/2 to PE and KARINA   - Check TTE  - repeat trop  cardiac monitor           GOC, Discussed with the patient daughter at bedside - Patient daughter confirmed DNR/DNI but she is interested to try cancer treatment     DVT ppx - on full AG     very poor prognosis

## 2023-01-23 NOTE — H&P ADULT - NSICDXPASTMEDICALHX_GEN_ALL_CORE_FT
PAST MEDICAL HISTORY:  Gallbladder neoplasm     Hypertension     Liver metastases     Pulmonary embolism

## 2023-01-23 NOTE — H&P ADULT - ASSESSMENT
82 year old Female known to have GB cancer with Liver mets, breast cancer s/p mastectomy, HTN, HLD, and discharged 1 week ago after being treated for PE and LLE was brought into the ER for shortness of breath.    # New left PE while on Eliquis  # Old right PE  # suspected failure of therapy  - CT PE: new Left sided pulmonary embolism, old right PE  - Stop Eliquis  - start heparin IVdrip in PM (last eliquis dose was this 1/23/23@ 6AM)  - check PTT  - consult hem-onc     #UTI  #Urinary retention   # KARINA (post renal)  # bilateral hydronephrosis   - c/w cefepime   - f/u on urine culture and blood culture  - keep farris  - repeat creatinine  - consider nep[hriology consult if creatinine is still rising despite farris  - US kidney to re-evaluat hydronephrosis post farris insertion    # LE Edema   # r/o DVT  - get bilat LE duplex  - Hold lasix in setting of KARINA  - start heparin drip in OPM    # Troponinemia  - trop 0.05 stable on 2 sets  - trop Elevation liklely 2/2 to PE and KARINA   - Check TTE  - repeat trop  - Admit to telemetry for atleast 24hrs    # Gallbladder carcinoma with hepatic mets  # Hx of Breast cancer s/p mastectomy   # Obstructive Jaundice  # Ascites and peritoneal lymphadenopathy  # decreased PO intake and failure to thrive  # Goals of care  - Daily CMP and PT-INR  - consult [facility] for evaluation of biliary stenting   - Oncology consulted for follow up  - palliative care consult and hospice eval  - Analgesia PRN   - Poor general prognosis  - DNR/DNI: MOLST Form filled     # Essential Hypertension    - hold losartan and lasix in setting of KARINA  - DASH diet     #MISC  GI ppx protonix  DASH diet  code status DNR/DNI  Dispo Telemetry

## 2023-01-23 NOTE — CONSULT NOTE ADULT - PROBLEM SELECTOR RECOMMENDATION 4
- ongoing GOC, d/w family previously, goals were towards DNR/DNI but ongoing treatment given more recent cancer diagnosis  - per onc: options may include single agent chemotherapy such as gemcitabine and capecitabine, possible immunotherapy or possible targeted therapy, but is hospice appropriate  - will discuss with patient's daughter

## 2023-01-23 NOTE — H&P ADULT - NSHPLABSRESULTS_GEN_ALL_CORE
LABS:                        8.2    13.78 )-----------( 305      ( 23 Jan 2023 05:38 )             25.9     01-23    139  |  106  |  105<HH>  ----------------------------<  123<H>  5.2<H>   |  18  |  2.6<H>    Ca    8.1<L>      23 Jan 2023 05:38  Mg     2.7     01-23    TPro  4.9<L>  /  Alb  2.5<L>  /  TBili  4.5<H>  /  DBili  x   /  AST  310<H>  /  ALT  135<H>  /  AlkPhos  679<H>  01-23    Troponin T, Serum: 0.05: (01.22.23 @ 21:23)   Troponin T, Serum: 0.05: (01.23.23 @ 05:38)     EXAM:  CT ABDOMEN AND PELVIS W/ Contrast and  CT ANGIO CHEST    IMPRESSION:    Since 1/11/2023,    Again seen large emboli in the right lower lobe segmental and   subsegmental pulmonary artery branches, no significant change.    New left upper lobe segmental pulmonary embolus.    Moderate left pleural effusion with adjacent atelectasis.    New moderate bilateral hydronephrosis with markedly distended urinary   bladder, suspicious for urinary bladder retention.    Again seen irregular gallbladder wall thickening, diffuse hepatic   metastasis and retroperitoneal and periportal lymphadenopathy, consistent   with given history of gallbladder carcinoma.    Suspicious sclerotic foci within L2 and L3 vertebral bodies.    ECG:  Normal sinus rhythm  Low voltage QRS  T wave abnormality, consider anterior ischemia  Abnormal ECG LABS:                        8.2    13.78 )-----------( 305      ( 23 Jan 2023 05:38 )             25.9     01-23    139  |  106  |  105<HH>  ----------------------------<  123<H>  5.2<H>   |  18  |  2.6<H>    Ca    8.1<L>      23 Jan 2023 05:38  Mg     2.7     01-23    TPro  4.9<L>  /  Alb  2.5<L>  /  TBili  4.5<H>  /  DBili  x   /  AST  310<H>  /  ALT  135<H>  /  AlkPhos  679<H>  01-23    Troponin T, Serum: 0.05: (01.22.23 @ 21:23)   Troponin T, Serum: 0.05: (01.23.23 @ 05:38)     EXAM:  CT ABDOMEN AND PELVIS W/ Contrast and  CT ANGIO CHEST    IMPRESSION:    Again seen large emboli in the right lower lobe segmental and   subsegmental pulmonary artery branches, no significant change.    New left upper lobe segmental pulmonary embolus.    Moderate left pleural effusion with adjacent atelectasis.    New moderate bilateral hydronephrosis with markedly distended urinary   bladder, suspicious for urinary bladder retention.    Again seen irregular gallbladder wall thickening, diffuse hepatic   metastasis and retroperitoneal and periportal lymphadenopathy, consistent   with given history of gallbladder carcinoma.    Mild ascites    Suspicious sclerotic foci within L2 and L3 vertebral bodies.    ECG:  Normal sinus rhythm  Low voltage QRS  T wave abnormality, consider anterior ischemia  Abnormal ECG

## 2023-01-24 LAB
ALBUMIN SERPL ELPH-MCNC: 2.4 G/DL — LOW (ref 3.5–5.2)
ALP SERPL-CCNC: 630 U/L — HIGH (ref 30–115)
ALT FLD-CCNC: 189 U/L — HIGH (ref 0–41)
ANION GAP SERPL CALC-SCNC: 19 MMOL/L — HIGH (ref 7–14)
APTT BLD: 101.9 SEC — CRITICAL HIGH (ref 27–39.2)
APTT BLD: 88.6 SEC — CRITICAL HIGH (ref 27–39.2)
APTT BLD: 94.3 SEC — CRITICAL HIGH (ref 27–39.2)
APTT BLD: 95.3 SEC — CRITICAL HIGH (ref 27–39.2)
AST SERPL-CCNC: 357 U/L — HIGH (ref 0–41)
BASOPHILS # BLD AUTO: 0.06 K/UL — SIGNIFICANT CHANGE UP (ref 0–0.2)
BASOPHILS NFR BLD AUTO: 0.4 % — SIGNIFICANT CHANGE UP (ref 0–1)
BILIRUB SERPL-MCNC: 5.2 MG/DL — HIGH (ref 0.2–1.2)
BUN SERPL-MCNC: 129 MG/DL — CRITICAL HIGH (ref 10–20)
CALCIUM SERPL-MCNC: 8.3 MG/DL — LOW (ref 8.4–10.5)
CHLORIDE SERPL-SCNC: 103 MMOL/L — SIGNIFICANT CHANGE UP (ref 98–110)
CO2 SERPL-SCNC: 15 MMOL/L — LOW (ref 17–32)
CREAT SERPL-MCNC: 3.6 MG/DL — HIGH (ref 0.7–1.5)
EGFR: 12 ML/MIN/1.73M2 — LOW
EOSINOPHIL # BLD AUTO: 0 K/UL — SIGNIFICANT CHANGE UP (ref 0–0.7)
EOSINOPHIL NFR BLD AUTO: 0 % — SIGNIFICANT CHANGE UP (ref 0–8)
GLUCOSE SERPL-MCNC: 135 MG/DL — HIGH (ref 70–99)
HCT VFR BLD CALC: 23.5 % — LOW (ref 37–47)
HCT VFR BLD CALC: 23.9 % — LOW (ref 37–47)
HCT VFR BLD CALC: 24.2 % — LOW (ref 37–47)
HGB BLD-MCNC: 7.5 G/DL — LOW (ref 12–16)
HGB BLD-MCNC: 7.7 G/DL — LOW (ref 12–16)
HGB BLD-MCNC: 7.7 G/DL — LOW (ref 12–16)
IMM GRANULOCYTES NFR BLD AUTO: 3.7 % — HIGH (ref 0.1–0.3)
INR BLD: 2 RATIO — HIGH (ref 0.65–1.3)
LYMPHOCYTES # BLD AUTO: 1.8 K/UL — SIGNIFICANT CHANGE UP (ref 1.2–3.4)
LYMPHOCYTES # BLD AUTO: 10.9 % — LOW (ref 20.5–51.1)
MAGNESIUM SERPL-MCNC: 2.9 MG/DL — HIGH (ref 1.8–2.4)
MCHC RBC-ENTMCNC: 31.8 G/DL — LOW (ref 32–37)
MCHC RBC-ENTMCNC: 31.9 G/DL — LOW (ref 32–37)
MCHC RBC-ENTMCNC: 32 PG — HIGH (ref 27–31)
MCHC RBC-ENTMCNC: 32.1 PG — HIGH (ref 27–31)
MCHC RBC-ENTMCNC: 32.2 G/DL — SIGNIFICANT CHANGE UP (ref 32–37)
MCHC RBC-ENTMCNC: 32.4 PG — HIGH (ref 27–31)
MCV RBC AUTO: 100.4 FL — HIGH (ref 81–99)
MONOCYTES # BLD AUTO: 0.73 K/UL — HIGH (ref 0.1–0.6)
MONOCYTES NFR BLD AUTO: 4.4 % — SIGNIFICANT CHANGE UP (ref 1.7–9.3)
NEUTROPHILS # BLD AUTO: 13.25 K/UL — HIGH (ref 1.4–6.5)
NEUTROPHILS NFR BLD AUTO: 80.6 % — HIGH (ref 42.2–75.2)
NRBC # BLD: 0 /100 WBCS — SIGNIFICANT CHANGE UP (ref 0–0)
NRBC # BLD: 0 /100 WBCS — SIGNIFICANT CHANGE UP (ref 0–0)
NRBC # BLD: 2 /100 WBCS — HIGH (ref 0–0)
PHOSPHATE SERPL-MCNC: 8.5 MG/DL — HIGH (ref 2.1–4.9)
PLATELET # BLD AUTO: 276 K/UL — SIGNIFICANT CHANGE UP (ref 130–400)
PLATELET # BLD AUTO: 277 K/UL — SIGNIFICANT CHANGE UP (ref 130–400)
PLATELET # BLD AUTO: 279 K/UL — SIGNIFICANT CHANGE UP (ref 130–400)
POTASSIUM SERPL-MCNC: 5.6 MMOL/L — HIGH (ref 3.5–5)
POTASSIUM SERPL-SCNC: 5.6 MMOL/L — HIGH (ref 3.5–5)
PROT SERPL-MCNC: 4.9 G/DL — LOW (ref 6–8)
PROTHROM AB SERPL-ACNC: 23.3 SEC — HIGH (ref 9.95–12.87)
RBC # BLD: 2.34 M/UL — LOW (ref 4.2–5.4)
RBC # BLD: 2.38 M/UL — LOW (ref 4.2–5.4)
RBC # BLD: 2.41 M/UL — LOW (ref 4.2–5.4)
RBC # FLD: 17.9 % — HIGH (ref 11.5–14.5)
SODIUM SERPL-SCNC: 137 MMOL/L — SIGNIFICANT CHANGE UP (ref 135–146)
TROPONIN T SERPL-MCNC: 0.06 NG/ML — CRITICAL HIGH
WBC # BLD: 15.8 K/UL — HIGH (ref 4.8–10.8)
WBC # BLD: 15.94 K/UL — HIGH (ref 4.8–10.8)
WBC # BLD: 16.45 K/UL — HIGH (ref 4.8–10.8)
WBC # FLD AUTO: 15.8 K/UL — HIGH (ref 4.8–10.8)
WBC # FLD AUTO: 15.94 K/UL — HIGH (ref 4.8–10.8)
WBC # FLD AUTO: 16.45 K/UL — HIGH (ref 4.8–10.8)

## 2023-01-24 PROCEDURE — 99233 SBSQ HOSP IP/OBS HIGH 50: CPT

## 2023-01-24 RX ORDER — HEPARIN SODIUM 5000 [USP'U]/ML
3500 INJECTION INTRAVENOUS; SUBCUTANEOUS EVERY 6 HOURS
Refills: 0 | Status: DISCONTINUED | OUTPATIENT
Start: 2023-01-24 | End: 2023-01-27

## 2023-01-24 RX ORDER — DIPHENHYDRAMINE HCL 50 MG
50 CAPSULE ORAL EVERY 4 HOURS
Refills: 0 | Status: DISCONTINUED | OUTPATIENT
Start: 2023-01-24 | End: 2023-01-28

## 2023-01-24 RX ORDER — HEPARIN SODIUM 5000 [USP'U]/ML
3500 INJECTION INTRAVENOUS; SUBCUTANEOUS ONCE
Refills: 0 | Status: DISCONTINUED | OUTPATIENT
Start: 2023-01-24 | End: 2023-01-27

## 2023-01-24 RX ORDER — HEPARIN SODIUM 5000 [USP'U]/ML
INJECTION INTRAVENOUS; SUBCUTANEOUS
Qty: 25000 | Refills: 0 | Status: DISCONTINUED | OUTPATIENT
Start: 2023-01-24 | End: 2023-01-27

## 2023-01-24 RX ORDER — HEPARIN SODIUM 5000 [USP'U]/ML
1500 INJECTION INTRAVENOUS; SUBCUTANEOUS EVERY 6 HOURS
Refills: 0 | Status: DISCONTINUED | OUTPATIENT
Start: 2023-01-24 | End: 2023-01-27

## 2023-01-24 RX ORDER — OXYCODONE HYDROCHLORIDE 5 MG/1
5 TABLET ORAL EVERY 8 HOURS
Refills: 0 | Status: DISCONTINUED | OUTPATIENT
Start: 2023-01-24 | End: 2023-01-28

## 2023-01-24 RX ADMIN — MEMANTINE HYDROCHLORIDE 10 MILLIGRAM(S): 10 TABLET ORAL at 18:39

## 2023-01-24 RX ADMIN — POLYETHYLENE GLYCOL 3350 17 GRAM(S): 17 POWDER, FOR SOLUTION ORAL at 11:43

## 2023-01-24 RX ADMIN — MEMANTINE HYDROCHLORIDE 10 MILLIGRAM(S): 10 TABLET ORAL at 05:10

## 2023-01-24 RX ADMIN — CEFEPIME 100 MILLIGRAM(S): 1 INJECTION, POWDER, FOR SOLUTION INTRAMUSCULAR; INTRAVENOUS at 05:20

## 2023-01-24 RX ADMIN — PANTOPRAZOLE SODIUM 40 MILLIGRAM(S): 20 TABLET, DELAYED RELEASE ORAL at 05:10

## 2023-01-24 RX ADMIN — HEPARIN SODIUM 800 UNIT(S)/HR: 5000 INJECTION INTRAVENOUS; SUBCUTANEOUS at 11:05

## 2023-01-24 RX ADMIN — Medication 50 MILLIGRAM(S): at 11:42

## 2023-01-24 RX ADMIN — OXYCODONE HYDROCHLORIDE 5 MILLIGRAM(S): 5 TABLET ORAL at 04:00

## 2023-01-24 RX ADMIN — HEPARIN SODIUM 800 UNIT(S)/HR: 5000 INJECTION INTRAVENOUS; SUBCUTANEOUS at 04:00

## 2023-01-24 RX ADMIN — HEPARIN SODIUM 800 UNIT(S)/HR: 5000 INJECTION INTRAVENOUS; SUBCUTANEOUS at 19:02

## 2023-01-24 NOTE — HOSPICE CARE NOTE - CONVESATION DETAILS
Update from patient’s son in law Donavan who is requesting to be first contact at [phone]- Patient pending further evaluation for treatment options. Per Donavan, patient may be a candidate for chemotherapy. Hospice team to remain available.

## 2023-01-24 NOTE — PROGRESS NOTE ADULT - ATTENDING COMMENTS
She was seen earlier today with fellow. I saw the patient for the first time approximately 2 weeks ago she underwent a biopsy of the liver metastases which confirmed gallbladder cancer.  Since then she started to decline rapidly.  Last time I saw her I was able to walk her to the bathroom she needed some assistance now she is no longer ambulatory there is worsening of edema she is not eating or drinking much.  She is now in renal failure and also going to liver failure due to metastatic burden and was also noted to have a new pulmonary embolism in the setting of anticoagulation Eliquis and currently on heparin drip.    I had a extensive conversation with the family including daughter granddaughter as well as your  Donavan  services were utilized.    I explained her rapid decline she is not a candidate for any sort of treatment since is unlikely to be of any benefit and recommended hospice and comfort measures only.    After long discussion the family agreed regardless I would not offer any systemic treatment since this would be futile.    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

## 2023-01-24 NOTE — CHART NOTE - NSCHARTNOTEFT_GEN_A_CORE
PALLIATIVE MEDICINE INTERDISCIPLINARY TEAM NOTE    Provider:                                         [ x  ] Initial visit        Met with: [  x ] Patient  [ x  ] Family  [   ] Other:    Primary Language: [ x  ] English [   ] Other*:                      *Interpretation provided by:    SUPPORT DIAGNOSES            (Check all that apply)    [   ] EOL issues  [ x  ] Advanced Illness  [   ] Cultural / spiritual concerns  [   ] Pain / suffering  [   ] Dementia / AMS  [   ] Other:  [   ] AD issues  [   ] Grief / loss / sadness  [   ] Discharge issues  [  x ] Distress / coping    PSYCHOSOCIAL ASSESSMENT OF PATIENT         (Check all that apply)    [  x ] Initial Assessment            [   ] Reassessment          [   ] Not Applicable this visit    Pain/suffering acuity:  [  x ] None to mild (0-3)           [   ] Moderate (4-6)        [   ] High (7-10)    Mental Status:  [   ] Alert/oriented (x3)          [x   ] Confused/Altered(x2)         [   ] Non-resp    Functional status:  [   ] Independent w ADLs      [  x ] Needs Assistance             [   ] Bedbound/Full Care    Coping:  [   ] Coping well                     [ x  ] Coping w/difficulty            [   ] Poor coping    Support system:  [ x  ] Strong                              [   ] Adequate                        [   ] Inadequate      Past history and medications for:     [ ] Anxiety       [ ] Depression    [ ] Sleep disorders       SERVICE PROVIDED  [   ]Discharge support / facilitation  [   ]AD / goals of care counseling                                  [   ]EOL / death / bereavement counseling  [x   ]Counseling / support                                                [   ] Family meeting  [   ]Prayer / sacrament / ritual                                      [   ] Referral   [   ]Other                                                                       NOTE and Plan of Care (PoC):    patient is a 81 y/o F with pmhx of gallbladder cancer with liver mets, HTN, HLD, presenting with c/o dyspnea. patient known to this writer from prior admission at the [facility]. visited patient earlier today. patient encountered resting in bed. patient's daughter at bedside. she discussed patient has not had any complaints of pain or discomfort. She discussed patient had a change in her condition which is why she was brought to hospital again. patient's  is at the [facility]. patient's daughter discussed she and her  had been waiting for call today from hospice. hospice notified. support rendered. will follow. [phone]

## 2023-01-24 NOTE — HOSPICE CARE NOTE - CONVESATION DETAILS
Patient known to hospice as she was consulted on last hospitalization a few weeks ago. At this time family is seeking possible treatment for terminal cancer. Family is requesting placement at [facility] as patient's spouse is presently at the [facility] and anticipated to be transferred to [facility] as a LTC resident. Hospice dispo remain unclear as family's goal is treatment options. Please update hospice intake rn at [phone] should no treatment options be available.

## 2023-01-25 ENCOUNTER — TRANSCRIPTION ENCOUNTER (OUTPATIENT)
Age: 83
End: 2023-01-25

## 2023-01-25 LAB
-  AMIKACIN: SIGNIFICANT CHANGE UP
-  AMOXICILLIN/CLAVULANIC ACID: SIGNIFICANT CHANGE UP
-  AMPICILLIN/SULBACTAM: SIGNIFICANT CHANGE UP
-  AMPICILLIN: SIGNIFICANT CHANGE UP
-  AZTREONAM: SIGNIFICANT CHANGE UP
-  CEFAZOLIN: SIGNIFICANT CHANGE UP
-  CEFEPIME: SIGNIFICANT CHANGE UP
-  CEFOXITIN: SIGNIFICANT CHANGE UP
-  CEFTRIAXONE: SIGNIFICANT CHANGE UP
-  CEFUROXIME: SIGNIFICANT CHANGE UP
-  CIPROFLOXACIN: SIGNIFICANT CHANGE UP
-  ERTAPENEM: SIGNIFICANT CHANGE UP
-  GENTAMICIN: SIGNIFICANT CHANGE UP
-  IMIPENEM: SIGNIFICANT CHANGE UP
-  LEVOFLOXACIN: SIGNIFICANT CHANGE UP
-  MEROPENEM: SIGNIFICANT CHANGE UP
-  NITROFURANTOIN: SIGNIFICANT CHANGE UP
-  PIPERACILLIN/TAZOBACTAM: SIGNIFICANT CHANGE UP
-  TOBRAMYCIN: SIGNIFICANT CHANGE UP
-  TRIMETHOPRIM/SULFAMETHOXAZOLE: SIGNIFICANT CHANGE UP
ALBUMIN SERPL ELPH-MCNC: 2.5 G/DL — LOW (ref 3.5–5.2)
ALP SERPL-CCNC: 615 U/L — HIGH (ref 30–115)
ALT FLD-CCNC: 206 U/L — HIGH (ref 0–41)
ANION GAP SERPL CALC-SCNC: 17 MMOL/L — HIGH (ref 7–14)
APTT BLD: 76.4 SEC — CRITICAL HIGH (ref 27–39.2)
APTT BLD: 92.4 SEC — CRITICAL HIGH (ref 27–39.2)
AST SERPL-CCNC: 368 U/L — HIGH (ref 0–41)
BASOPHILS # BLD AUTO: 0.06 K/UL — SIGNIFICANT CHANGE UP (ref 0–0.2)
BASOPHILS NFR BLD AUTO: 0.4 % — SIGNIFICANT CHANGE UP (ref 0–1)
BILIRUB SERPL-MCNC: 5.9 MG/DL — HIGH (ref 0.2–1.2)
BLD GP AB SCN SERPL QL: SIGNIFICANT CHANGE UP
BUN SERPL-MCNC: 144 MG/DL — CRITICAL HIGH (ref 10–20)
CALCIUM SERPL-MCNC: 7.7 MG/DL — LOW (ref 8.4–10.4)
CHLORIDE SERPL-SCNC: 102 MMOL/L — SIGNIFICANT CHANGE UP (ref 98–110)
CO2 SERPL-SCNC: 17 MMOL/L — SIGNIFICANT CHANGE UP (ref 17–32)
CREAT SERPL-MCNC: 4.6 MG/DL — CRITICAL HIGH (ref 0.7–1.5)
CULTURE RESULTS: SIGNIFICANT CHANGE UP
EGFR: 9 ML/MIN/1.73M2 — LOW
EOSINOPHIL # BLD AUTO: 0 K/UL — SIGNIFICANT CHANGE UP (ref 0–0.7)
EOSINOPHIL NFR BLD AUTO: 0 % — SIGNIFICANT CHANGE UP (ref 0–8)
GLUCOSE SERPL-MCNC: 174 MG/DL — HIGH (ref 70–99)
HCT VFR BLD CALC: 21.8 % — LOW (ref 37–47)
HGB BLD-MCNC: 7 G/DL — LOW (ref 12–16)
IMM GRANULOCYTES NFR BLD AUTO: 5.7 % — HIGH (ref 0.1–0.3)
LYMPHOCYTES # BLD AUTO: 1.65 K/UL — SIGNIFICANT CHANGE UP (ref 1.2–3.4)
LYMPHOCYTES # BLD AUTO: 9.9 % — LOW (ref 20.5–51.1)
MAGNESIUM SERPL-MCNC: 2.8 MG/DL — HIGH (ref 1.8–2.4)
MCHC RBC-ENTMCNC: 32 PG — HIGH (ref 27–31)
MCHC RBC-ENTMCNC: 32.1 G/DL — SIGNIFICANT CHANGE UP (ref 32–37)
MCV RBC AUTO: 99.5 FL — HIGH (ref 81–99)
METHOD TYPE: SIGNIFICANT CHANGE UP
MONOCYTES # BLD AUTO: 0.64 K/UL — HIGH (ref 0.1–0.6)
MONOCYTES NFR BLD AUTO: 3.8 % — SIGNIFICANT CHANGE UP (ref 1.7–9.3)
NEUTROPHILS # BLD AUTO: 13.34 K/UL — HIGH (ref 1.4–6.5)
NEUTROPHILS NFR BLD AUTO: 80.2 % — HIGH (ref 42.2–75.2)
NRBC # BLD: 2 /100 WBCS — HIGH (ref 0–0)
ORGANISM # SPEC MICROSCOPIC CNT: SIGNIFICANT CHANGE UP
ORGANISM # SPEC MICROSCOPIC CNT: SIGNIFICANT CHANGE UP
PHOSPHATE SERPL-MCNC: 9.3 MG/DL — HIGH (ref 2.1–4.9)
PLATELET # BLD AUTO: 242 K/UL — SIGNIFICANT CHANGE UP (ref 130–400)
POTASSIUM SERPL-MCNC: 5.7 MMOL/L — HIGH (ref 3.5–5)
POTASSIUM SERPL-SCNC: 5.7 MMOL/L — HIGH (ref 3.5–5)
PROT SERPL-MCNC: 5.2 G/DL — LOW (ref 6–8)
RBC # BLD: 2.19 M/UL — LOW (ref 4.2–5.4)
RBC # FLD: 18.3 % — HIGH (ref 11.5–14.5)
SODIUM SERPL-SCNC: 136 MMOL/L — SIGNIFICANT CHANGE UP (ref 135–146)
SPECIMEN SOURCE: SIGNIFICANT CHANGE UP
WBC # BLD: 16.63 K/UL — HIGH (ref 4.8–10.8)
WBC # FLD AUTO: 16.63 K/UL — HIGH (ref 4.8–10.8)

## 2023-01-25 PROCEDURE — 99233 SBSQ HOSP IP/OBS HIGH 50: CPT

## 2023-01-25 PROCEDURE — 99232 SBSQ HOSP IP/OBS MODERATE 35: CPT

## 2023-01-25 RX ORDER — DEXTROSE 10 % IN WATER 10 %
1000 INTRAVENOUS SOLUTION INTRAVENOUS
Refills: 0 | Status: DISCONTINUED | OUTPATIENT
Start: 2023-01-25 | End: 2023-01-28

## 2023-01-25 RX ORDER — SODIUM ZIRCONIUM CYCLOSILICATE 10 G/10G
5 POWDER, FOR SUSPENSION ORAL THREE TIMES A DAY
Refills: 0 | Status: DISCONTINUED | OUTPATIENT
Start: 2023-01-25 | End: 2023-01-25

## 2023-01-25 RX ORDER — INSULIN HUMAN 100 [IU]/ML
5 INJECTION, SOLUTION SUBCUTANEOUS ONCE
Refills: 0 | Status: COMPLETED | OUTPATIENT
Start: 2023-01-25 | End: 2023-01-25

## 2023-01-25 RX ORDER — DEXTROSE 50 % IN WATER 50 %
25 SYRINGE (ML) INTRAVENOUS ONCE
Refills: 0 | Status: COMPLETED | OUTPATIENT
Start: 2023-01-25 | End: 2023-01-25

## 2023-01-25 RX ORDER — SODIUM CHLORIDE 9 MG/ML
1000 INJECTION, SOLUTION INTRAVENOUS
Refills: 0 | Status: DISCONTINUED | OUTPATIENT
Start: 2023-01-25 | End: 2023-01-28

## 2023-01-25 RX ADMIN — OXYCODONE HYDROCHLORIDE 5 MILLIGRAM(S): 5 TABLET ORAL at 14:55

## 2023-01-25 RX ADMIN — SENNA PLUS 2 TABLET(S): 8.6 TABLET ORAL at 02:15

## 2023-01-25 RX ADMIN — HYDROMORPHONE HYDROCHLORIDE 2 MILLIGRAM(S): 2 INJECTION INTRAMUSCULAR; INTRAVENOUS; SUBCUTANEOUS at 02:50

## 2023-01-25 RX ADMIN — Medication 50 MILLIGRAM(S): at 02:24

## 2023-01-25 RX ADMIN — CEFEPIME 100 MILLIGRAM(S): 1 INJECTION, POWDER, FOR SOLUTION INTRAMUSCULAR; INTRAVENOUS at 05:55

## 2023-01-25 RX ADMIN — INSULIN HUMAN 5 UNIT(S): 100 INJECTION, SOLUTION SUBCUTANEOUS at 09:45

## 2023-01-25 RX ADMIN — SODIUM CHLORIDE 75 MILLILITER(S): 9 INJECTION, SOLUTION INTRAVENOUS at 08:58

## 2023-01-25 RX ADMIN — Medication 250 MILLILITER(S): at 10:06

## 2023-01-25 RX ADMIN — POLYETHYLENE GLYCOL 3350 17 GRAM(S): 17 POWDER, FOR SOLUTION ORAL at 11:00

## 2023-01-25 RX ADMIN — MEMANTINE HYDROCHLORIDE 10 MILLIGRAM(S): 10 TABLET ORAL at 17:08

## 2023-01-25 RX ADMIN — HEPARIN SODIUM 800 UNIT(S)/HR: 5000 INJECTION INTRAVENOUS; SUBCUTANEOUS at 05:57

## 2023-01-25 RX ADMIN — MEMANTINE HYDROCHLORIDE 10 MILLIGRAM(S): 10 TABLET ORAL at 05:55

## 2023-01-25 RX ADMIN — HEPARIN SODIUM 800 UNIT(S)/HR: 5000 INJECTION INTRAVENOUS; SUBCUTANEOUS at 07:52

## 2023-01-25 RX ADMIN — HEPARIN SODIUM 800 UNIT(S)/HR: 5000 INJECTION INTRAVENOUS; SUBCUTANEOUS at 16:48

## 2023-01-25 RX ADMIN — SENNA PLUS 2 TABLET(S): 8.6 TABLET ORAL at 21:33

## 2023-01-25 RX ADMIN — ATORVASTATIN CALCIUM 10 MILLIGRAM(S): 80 TABLET, FILM COATED ORAL at 21:32

## 2023-01-25 RX ADMIN — CEFEPIME 100 MILLIGRAM(S): 1 INJECTION, POWDER, FOR SOLUTION INTRAMUSCULAR; INTRAVENOUS at 17:08

## 2023-01-25 RX ADMIN — PANTOPRAZOLE SODIUM 40 MILLIGRAM(S): 20 TABLET, DELAYED RELEASE ORAL at 05:54

## 2023-01-25 NOTE — PHYSICAL THERAPY INITIAL EVALUATION ADULT - GENERAL OBSERVATIONS, REHAB EVAL
Chart reviewed, discussed case with Dr.Aziz Amador via Teams and agreeable to HOLD PT at this time in the event of new JEAN PIERRE PE findings. Will f/u as ailin.
pt seen in ED4. [id number] pm. 81 y/o F rec'd/left in bed, nad, + bed alarm, dtr present. pt appears sleepy, opening eyes and rubbing her upper abdomen. dtr provided translation for Mandarin. pt not speaking at this time, only nodding/shaking the head for yes/no to basic questions. AA/PROM bilat UE/LE's WDL, pt with minimal AROM in bilat UE's. attempted supine > sit, however, pt not able to assist and/or tolerate.

## 2023-01-25 NOTE — PHARMACOTHERAPY INTERVENTION NOTE - COMMENTS
Modified penicillin allergy to state patient tolerated cefepime during this admission.    Olivier Luther, PharmD  Clinical Pharmacy Specialist, Infectious Diseases  Tele-Antimicrobial Stewardship Program (Tele-ASP)  Tele-ASP Phone: [phone]

## 2023-01-25 NOTE — HOSPICE CARE NOTE - CONVESATION DETAILS
Update from patient's son in law/ Family now requesting transfer to [facility] with hospice services. [facility] aware of pending LYNDA as they are presently working on accepting her spouse at the facility who is at the [facility]. Please update Hospice Intake RN when D/C plan is in place at [phone].

## 2023-01-25 NOTE — PHYSICAL THERAPY INITIAL EVALUATION ADULT - SPECIFY REASON(S)
Chart reviewed. PT evaluation on hold pending activity orders as appropriate. Will notify MD in unit using Teams. Will follow up.
New JEAN PIERRE PE
pt will be going to [facility] for Hospice Care

## 2023-01-26 LAB
ALBUMIN SERPL ELPH-MCNC: 2.3 G/DL — LOW (ref 3.5–5.2)
ALP SERPL-CCNC: 627 U/L — HIGH (ref 30–115)
ALT FLD-CCNC: 190 U/L — HIGH (ref 0–41)
ANION GAP SERPL CALC-SCNC: 26 MMOL/L — HIGH (ref 7–14)
APTT BLD: 94.2 SEC — CRITICAL HIGH (ref 27–39.2)
APTT BLD: 98.4 SEC — CRITICAL HIGH (ref 27–39.2)
AST SERPL-CCNC: 303 U/L — HIGH (ref 0–41)
BASOPHILS # BLD AUTO: 0.06 K/UL — SIGNIFICANT CHANGE UP (ref 0–0.2)
BASOPHILS NFR BLD AUTO: 0.4 % — SIGNIFICANT CHANGE UP (ref 0–1)
BILIRUB SERPL-MCNC: 8.2 MG/DL — HIGH (ref 0.2–1.2)
BLD GP AB SCN SERPL QL: SIGNIFICANT CHANGE UP
BUN SERPL-MCNC: 157 MG/DL — CRITICAL HIGH (ref 10–20)
CALCIUM SERPL-MCNC: 8 MG/DL — LOW (ref 8.4–10.5)
CHLORIDE SERPL-SCNC: 102 MMOL/L — SIGNIFICANT CHANGE UP (ref 98–110)
CO2 SERPL-SCNC: 17 MMOL/L — SIGNIFICANT CHANGE UP (ref 17–32)
CREAT SERPL-MCNC: 5.4 MG/DL — CRITICAL HIGH (ref 0.7–1.5)
EGFR: 7 ML/MIN/1.73M2 — LOW
EOSINOPHIL # BLD AUTO: 0 K/UL — SIGNIFICANT CHANGE UP (ref 0–0.7)
EOSINOPHIL NFR BLD AUTO: 0 % — SIGNIFICANT CHANGE UP (ref 0–8)
GLUCOSE SERPL-MCNC: 181 MG/DL — HIGH (ref 70–99)
HCT VFR BLD CALC: 20.7 % — LOW (ref 37–47)
HGB BLD-MCNC: 6.8 G/DL — CRITICAL LOW (ref 12–16)
IMM GRANULOCYTES NFR BLD AUTO: 7.7 % — HIGH (ref 0.1–0.3)
LYMPHOCYTES # BLD AUTO: 1.78 K/UL — SIGNIFICANT CHANGE UP (ref 1.2–3.4)
LYMPHOCYTES # BLD AUTO: 11.4 % — LOW (ref 20.5–51.1)
MAGNESIUM SERPL-MCNC: 2.9 MG/DL — HIGH (ref 1.8–2.4)
MCHC RBC-ENTMCNC: 32.4 PG — HIGH (ref 27–31)
MCHC RBC-ENTMCNC: 32.9 G/DL — SIGNIFICANT CHANGE UP (ref 32–37)
MCV RBC AUTO: 98.6 FL — SIGNIFICANT CHANGE UP (ref 81–99)
MONOCYTES # BLD AUTO: 0.51 K/UL — SIGNIFICANT CHANGE UP (ref 0.1–0.6)
MONOCYTES NFR BLD AUTO: 3.3 % — SIGNIFICANT CHANGE UP (ref 1.7–9.3)
NEUTROPHILS # BLD AUTO: 12.01 K/UL — HIGH (ref 1.4–6.5)
NEUTROPHILS NFR BLD AUTO: 77.2 % — HIGH (ref 42.2–75.2)
NRBC # BLD: 2 /100 WBCS — HIGH (ref 0–0)
PHOSPHATE SERPL-MCNC: 9.6 MG/DL — HIGH (ref 2.1–4.9)
PLATELET # BLD AUTO: 199 K/UL — SIGNIFICANT CHANGE UP (ref 130–400)
POTASSIUM SERPL-MCNC: 5.6 MMOL/L — HIGH (ref 3.5–5)
POTASSIUM SERPL-SCNC: 5.6 MMOL/L — HIGH (ref 3.5–5)
PROT SERPL-MCNC: 4.9 G/DL — LOW (ref 6–8)
RBC # BLD: 2.1 M/UL — LOW (ref 4.2–5.4)
RBC # FLD: 19 % — HIGH (ref 11.5–14.5)
SODIUM SERPL-SCNC: 145 MMOL/L — SIGNIFICANT CHANGE UP (ref 135–146)
WBC # BLD: 15.55 K/UL — HIGH (ref 4.8–10.8)
WBC # FLD AUTO: 15.55 K/UL — HIGH (ref 4.8–10.8)

## 2023-01-26 PROCEDURE — 99233 SBSQ HOSP IP/OBS HIGH 50: CPT | Mod: 25

## 2023-01-26 PROCEDURE — 99497 ADVNCD CARE PLAN 30 MIN: CPT | Mod: 25

## 2023-01-26 RX ORDER — CEFEPIME 1 G/1
500 INJECTION, POWDER, FOR SOLUTION INTRAMUSCULAR; INTRAVENOUS EVERY 12 HOURS
Refills: 0 | Status: DISCONTINUED | OUTPATIENT
Start: 2023-01-26 | End: 2023-01-27

## 2023-01-26 RX ORDER — CALAMINE AND ZINC OXIDE AND PHENOL 160; 10 MG/ML; MG/ML
1 LOTION TOPICAL DAILY
Refills: 0 | Status: DISCONTINUED | OUTPATIENT
Start: 2023-01-26 | End: 2023-01-27

## 2023-01-26 RX ORDER — SODIUM ZIRCONIUM CYCLOSILICATE 10 G/10G
10 POWDER, FOR SUSPENSION ORAL ONCE
Refills: 0 | Status: COMPLETED | OUTPATIENT
Start: 2023-01-26 | End: 2023-01-26

## 2023-01-26 RX ADMIN — HEPARIN SODIUM 800 UNIT(S)/HR: 5000 INJECTION INTRAVENOUS; SUBCUTANEOUS at 19:30

## 2023-01-26 RX ADMIN — POLYETHYLENE GLYCOL 3350 17 GRAM(S): 17 POWDER, FOR SOLUTION ORAL at 13:40

## 2023-01-26 RX ADMIN — MEMANTINE HYDROCHLORIDE 10 MILLIGRAM(S): 10 TABLET ORAL at 17:29

## 2023-01-26 RX ADMIN — SENNA PLUS 2 TABLET(S): 8.6 TABLET ORAL at 21:04

## 2023-01-26 RX ADMIN — SODIUM ZIRCONIUM CYCLOSILICATE 10 GRAM(S): 10 POWDER, FOR SUSPENSION ORAL at 13:39

## 2023-01-26 RX ADMIN — ATORVASTATIN CALCIUM 10 MILLIGRAM(S): 80 TABLET, FILM COATED ORAL at 21:04

## 2023-01-26 RX ADMIN — SODIUM CHLORIDE 75 MILLILITER(S): 9 INJECTION, SOLUTION INTRAVENOUS at 13:48

## 2023-01-26 RX ADMIN — HEPARIN SODIUM 800 UNIT(S)/HR: 5000 INJECTION INTRAVENOUS; SUBCUTANEOUS at 10:05

## 2023-01-26 RX ADMIN — Medication 50 MILLIGRAM(S): at 18:00

## 2023-01-26 RX ADMIN — CALAMINE AND ZINC OXIDE AND PHENOL 1 APPLICATION(S): 160; 10 LOTION TOPICAL at 20:22

## 2023-01-26 RX ADMIN — OXYCODONE HYDROCHLORIDE 5 MILLIGRAM(S): 5 TABLET ORAL at 03:25

## 2023-01-26 RX ADMIN — CEFEPIME 100 MILLIGRAM(S): 1 INJECTION, POWDER, FOR SOLUTION INTRAMUSCULAR; INTRAVENOUS at 05:56

## 2023-01-26 RX ADMIN — CEFEPIME 100 MILLIGRAM(S): 1 INJECTION, POWDER, FOR SOLUTION INTRAMUSCULAR; INTRAVENOUS at 17:57

## 2023-01-26 RX ADMIN — MEMANTINE HYDROCHLORIDE 10 MILLIGRAM(S): 10 TABLET ORAL at 05:56

## 2023-01-26 RX ADMIN — HEPARIN SODIUM 800 UNIT(S)/HR: 5000 INJECTION INTRAVENOUS; SUBCUTANEOUS at 01:26

## 2023-01-26 NOTE — DIETITIAN INITIAL EVALUATION ADULT - PERTINENT MEDS FT
MEDICATIONS  (STANDING):  atorvastatin 10 milliGRAM(s) Oral at bedtime  cefepime   IVPB 500 milliGRAM(s) IV Intermittent every 12 hours  dextrose 10%. 1000 milliLiter(s) (250 mL/Hr) IV Continuous <Continuous>  dextrose 5% 1000 milliLiter(s) (75 mL/Hr) IV Continuous <Continuous>  heparin   Injectable 3500 Unit(s) IV Push once  heparin  Infusion.  Unit(s)/Hr (8 mL/Hr) IV Continuous <Continuous>  memantine 10 milliGRAM(s) Oral two times a day  pantoprazole    Tablet 40 milliGRAM(s) Oral before breakfast  polyethylene glycol 3350 17 Gram(s) Oral daily  senna 2 Tablet(s) Oral at bedtime    MEDICATIONS  (PRN):  aluminum hydroxide/magnesium hydroxide/simethicone Suspension 30 milliLiter(s) Oral every 4 hours PRN Dyspepsia  bisacodyl 5 milliGRAM(s) Oral at bedtime PRN Constipation  diphenhydrAMINE 50 milliGRAM(s) Oral every 4 hours PRN Rash and/or Itching  heparin   Injectable 3500 Unit(s) IV Push every 6 hours PRN For aPTT less than 40  heparin   Injectable 1500 Unit(s) IV Push every 6 hours PRN For aPTT between 40 - 57  meclizine 25 milliGRAM(s) Oral daily PRN Dizziness  oxyCODONE    IR 5 milliGRAM(s) Oral every 8 hours PRN Severe Pain (7 - 10)

## 2023-01-26 NOTE — DIETITIAN INITIAL EVALUATION ADULT - OTHER INFO
Patient is 83 yo female  known to have GB cancer with liver mets, breast cancer s/p mastectomy, HTN, HLD. Discharged 1 week ago after being treated for PE and LLE. Brought in for SOB  #Left PE  #right PE hx  #KARINA  # Gallbladder carcinoma with hepatic mets  # Hx of Breast cancer s/p mastectomy   # Obstructive Jaundice  # Ascites and peritoneal lymphadenopathy  - DNR/DNI  - NO further chemo offered by heme/onc  - hospice recalled    Poor prognosis, hospice to follow

## 2023-01-26 NOTE — DIETITIAN INITIAL EVALUATION ADULT - ADD RECOMMEND
1) Continue current diet order  2) Recommend Ensure Compact 3x/day to optimize kcal and protein intake   3) Encouragement and assistance with all meals, snacks, supplement  4) Recommend multivitamin daily, vitamin c 500 mg daily, zinc sulfate 220 mg daily (x10-14 days) to aid in promoting wound healing     Patient is at high nutrition risk, RD to f/u in 3-5 days or PRN

## 2023-01-26 NOTE — DIETITIAN INITIAL EVALUATION ADULT - PERTINENT LABORATORY DATA
01-26    145  |  102  |  157<HH>  ----------------------------<  181<H>  5.6<H>   |  17  |  5.4<HH>    Ca    8.0<L>      26 Jan 2023 07:13  Phos  9.6     01-26  Mg     2.9     01-26    TPro  4.9<L>  /  Alb  2.3<L>  /  TBili  8.2<H>  /  DBili  x   /  AST  303<H>  /  ALT  190<H>  /  AlkPhos  627<H>  01-26

## 2023-01-26 NOTE — DIETITIAN INITIAL EVALUATION ADULT - ORAL INTAKE PTA/DIET HISTORY
Patient noted to be disoriented x 4; Attempted to call patient's daughter ([phone]) but was unable to reach her at this time. Will attempt to obtain nutrition hx at follow up.     Patient noted with poor PO intake (25-50%) receives assistance with PO intake of meals

## 2023-01-26 NOTE — DIETITIAN INITIAL EVALUATION ADULT - COLLABORATION WITH OTHER PROVIDERS
Interventions: meals and snacks, medical food supplements, coordination of care  Monitoring/Evaluation: diet order, energy intake, weight, labs, skin status, NFPF

## 2023-01-27 ENCOUNTER — TRANSCRIPTION ENCOUNTER (OUTPATIENT)
Age: 83
End: 2023-01-27

## 2023-01-27 VITALS
DIASTOLIC BLOOD PRESSURE: 51 MMHG | OXYGEN SATURATION: 98 % | RESPIRATION RATE: 18 BRPM | TEMPERATURE: 97 F | SYSTOLIC BLOOD PRESSURE: 102 MMHG | HEART RATE: 82 BPM

## 2023-01-27 LAB — SARS-COV-2 RNA SPEC QL NAA+PROBE: SIGNIFICANT CHANGE UP

## 2023-01-27 PROCEDURE — 99233 SBSQ HOSP IP/OBS HIGH 50: CPT

## 2023-01-27 PROCEDURE — 99238 HOSP IP/OBS DSCHRG MGMT 30/<: CPT

## 2023-01-27 RX ORDER — MORPHINE SULFATE 50 MG/1
2 CAPSULE, EXTENDED RELEASE ORAL EVERY 4 HOURS
Refills: 0 | Status: DISCONTINUED | OUTPATIENT
Start: 2023-01-27 | End: 2023-01-28

## 2023-01-27 RX ORDER — LANOLIN ALCOHOL/MO/W.PET/CERES
5 CREAM (GRAM) TOPICAL AT BEDTIME
Refills: 0 | Status: DISCONTINUED | OUTPATIENT
Start: 2023-01-27 | End: 2023-01-27

## 2023-01-27 RX ORDER — MORPHINE SULFATE 50 MG/1
2 CAPSULE, EXTENDED RELEASE ORAL
Refills: 0 | Status: DISCONTINUED | OUTPATIENT
Start: 2023-01-27 | End: 2023-01-28

## 2023-01-27 RX ORDER — MORPHINE SULFATE 50 MG/1
2 CAPSULE, EXTENDED RELEASE ORAL ONCE
Refills: 0 | Status: DISCONTINUED | OUTPATIENT
Start: 2023-01-27 | End: 2023-01-27

## 2023-01-27 RX ORDER — DIPHENHYDRAMINE HCL 50 MG
50 CAPSULE ORAL EVERY 6 HOURS
Refills: 0 | Status: DISCONTINUED | OUTPATIENT
Start: 2023-01-27 | End: 2023-01-28

## 2023-01-27 RX ADMIN — SODIUM CHLORIDE 75 MILLILITER(S): 9 INJECTION, SOLUTION INTRAVENOUS at 06:07

## 2023-01-27 RX ADMIN — MORPHINE SULFATE 2 MILLIGRAM(S): 50 CAPSULE, EXTENDED RELEASE ORAL at 17:25

## 2023-01-27 RX ADMIN — Medication 50 MILLIGRAM(S): at 05:43

## 2023-01-27 RX ADMIN — MEMANTINE HYDROCHLORIDE 10 MILLIGRAM(S): 10 TABLET ORAL at 05:43

## 2023-01-27 RX ADMIN — MORPHINE SULFATE 2 MILLIGRAM(S): 50 CAPSULE, EXTENDED RELEASE ORAL at 09:00

## 2023-01-27 RX ADMIN — CALAMINE AND ZINC OXIDE AND PHENOL 1 APPLICATION(S): 160; 10 LOTION TOPICAL at 12:01

## 2023-01-27 RX ADMIN — MORPHINE SULFATE 2 MILLIGRAM(S): 50 CAPSULE, EXTENDED RELEASE ORAL at 08:42

## 2023-01-27 RX ADMIN — CEFEPIME 100 MILLIGRAM(S): 1 INJECTION, POWDER, FOR SOLUTION INTRAMUSCULAR; INTRAVENOUS at 05:43

## 2023-01-27 RX ADMIN — Medication 5 MILLIGRAM(S): at 01:31

## 2023-01-27 RX ADMIN — OXYCODONE HYDROCHLORIDE 5 MILLIGRAM(S): 5 TABLET ORAL at 03:31

## 2023-01-27 RX ADMIN — MORPHINE SULFATE 2 MILLIGRAM(S): 50 CAPSULE, EXTENDED RELEASE ORAL at 21:00

## 2023-01-27 RX ADMIN — PANTOPRAZOLE SODIUM 40 MILLIGRAM(S): 20 TABLET, DELAYED RELEASE ORAL at 06:07

## 2023-01-27 NOTE — PROGRESS NOTE ADULT - ASSESSMENT
81 yo F PMHx metastatic gallbladder cancer to liver, breast cancer s/p mastectomy, HTN, HLD, with recent discharge 1 week prior to this admission presented for evaluation of SOB. Pt found to have pulmonary embolism.      Dyspnea due to pulmonary embolism  - currently on heparin  - was on Eliquis as outpt, likely failure of Eliquis    Acute on chronic anemia  - pt's hgb currently 6.8 today  - no obvious source of bleeding  - HESHAM declined  - family unsure if they want a transfusion    UTI  Urinary retention   KARINA (post renal)? with HAGMA   # bilateral hydronephrosis   - Bicarb drip started   - poor prognosis    Gallbladder carcinoma with hepatic mets  Hx of Breast cancer s/p mastectomy   Obstructive Jaundice  Ascites and peritoneal lymphadenopathy  - DNR/DNI  - NO further chemo offered by heme/onc  - hospice recalled    Essential Hypertension    - controlled    Over all patient has very poor prognosis. Family agreed to hospice and is currently waiting hospice placement at [facility]. I discussed stopping heparin, antibiotics, blood draws, vital signs. Daughter became concerned and wanted more agreesive meausres until patient is discharged to hospice. She requested a paracentesis for fluid removal. I explained pt does not have ascites but rather anasarca. She then called her  Donavan to assist in the conversation. He verbalized his understanding of acute anemia while on a/c. He understands pt could be bleeding internally which a/c will make worse but without a/c PE will worsen. He also verbalized his understanding that the transfusion has the potential to worsen anasarca. I explained that since the family wants an emphasis on comfort then we should d/c anticoagulation, antibiotics, blood draws and vitals. Donavan states they will think about it and get back to us.     #Progress Note Handoff:  Pending (specify):  decision regarding transfusion  Family discussion: as above  Disposition: Home___/SNF_hospice__/Other________/Unknown at this time________
82 year old Female known to have GB cancer with Liver mets, breast cancer s/p mastectomy, HTN, HLD, and discharged 1 week ago after being treated for PE and LLE was brought into the ER for shortness of breath.    # left PE   # right PE hx  -on heparin drip   - hem-onc seen    #UTI  #Urinary retention   # KARINA (post renal)? with HAGMA   # bilateral hydronephrosis   - Bicarb drip started   - poor prognosis    # Gallbladder carcinoma with hepatic mets  # Hx of Breast cancer s/p mastectomy   # Obstructive Jaundice  # Ascites and peritoneal lymphadenopathy  - DNR/DNI  - NO further chemo offered by heme/onc  - hospice recalled    # Essential Hypertension    - controlled    poor prognosis, hospice to follow    Lorena Rossa MD  Attending Hospitalist 
Heme/onc consulted this admission to evaluate whether pt had VTE treatment failure with eliquis.   Pt was on on lovenox from 1/11-1/12/23 then on eliquis from 1/13-1/14/23. Pt was off AC for at least 3 days for preparation of liver biopsy on 18th. Per family pt has only been on AC for the past 3-4 days (post biopsy) so that is an additional day pt was not on eliquis. It is possible pt developed new PE while not on AC during this period. It is too early to call this treatment failure. Would recommend continuing eliquis and continue to monitor pt for worsening respiratory status, evaluation of PE with echo r/o RHS. Provide supportive care for now. Hospice and palliative should be on board d/t pt's worsening functional status and worsening medical issues.    Impression  very likely gallbladder carcinoma with mets (liver/ now with vertebral)  bilateral pulmonary embolism iso of cancer/hypercoagulable state  inadequate initial Anticoagulation treatment    # New L sided pulmonary embolus  # R sided pulmonary embolus  # Venous thromboembolism in the setting of cancer  -< from: CT Angio Chest PE Protocol w/ IV Cont (01.23.23 @ 01:10) >  Since 1/11/2023,  Again seen large emboli in the right lower lobe segmental and   subsegmental pulmonary artery branches, no significant change.  New left upper lobe segmental pulmonary embolus.  < end of copied text >  -Pt likely needs more time on Eliquis and was not adequately treated d/t lapse in medication administration d/t biopsy preparation; would not consider this treatment failure at this time  - c/w heparin gtt. She can be switched eliquis.  -supportive management   -IVF  -check LE duplex  -obtain TTE  -hospice consult  -gi consult     # Multiorgan failure  - transaminitis secondary to liver mets  - KARINA likely from hypotension and decreased PO intake    # metastatic gallbladder carcinoma with liver metastases   -Last admission, team explained the treatment options will depend on confirmation pathology  -s/p o/p IR guided biopsy 1/18/23/ : pathology consistent with adenocarcinoma likely of gall bladder origin  -We had detailed conversation with the daughter, grand daughter and son in Law Donavan. We explained them given she is in multiorgan failure and her overall condition she is not candidate for chemo or immunotherapy. We told them she is appropriate for hospice and the focus should be on her comfort at this point.     # Pain due to malignancy  -palliative care management    For questions call [phone] or text via MS teams  
82 year old Female known to have GB cancer with Liver mets, breast cancer s/p mastectomy, HTN, HLD, and discharged 1 week ago after being treated for PE and LLE was brought into the ER for shortness of breath.    # left PE   # right PE hx  -on heparin drip   - hem-onc seen  - cefepime  - benadryl for allergic reaction    #Anemia  - Hgb 6.8  - Family not sure about transfusions    #UTI  #Urinary retention   # KARINA (post renal)? with HAGMA   # bilateral hydronephrosis   - Bicarb drip started   - poor prognosis    # Gallbladder carcinoma with hepatic mets  # Hx of Breast cancer s/p mastectomy   # Obstructive Jaundice  # Ascites and peritoneal lymphadenopathy  - DNR/DNI  - NO further chemo offered by heme/onc  - hospice recalled, family deciding on management    # Essential Hypertension    - controlled    poor prognosis, hospice to follow    
82 year old Female known to have GB cancer with Liver mets, breast cancer s/p mastectomy, HTN, HLD, and discharged 1 week ago after being treated for PE and LLE was brought into the ER for shortness of breath.    # left PE   # right PE hx  -on heparin drip   - hem-onc seen    #UTI  #Urinary retention   # KARINA (post renal)  # bilateral hydronephrosis   - poor prognosis    # Gallbladder carcinoma with hepatic mets  # Hx of Breast cancer s/p mastectomy   # Obstructive Jaundice  # Ascites and peritoneal lymphadenopathy  - DNR/DNI  - NO further chemo offered by heme/onc  - hospice recalled    # Essential Hypertension    - controlled    poor prognosis, hospice to follow
82F with PMH of gallbladder cancer with liver mets, breast CA s/p mastectomy, HTN, HLD, here after recent treatment for PE, with dyspnea. Seen by palliative on last admission, no major symptoms, goals were for continued treatment at the time. Noted to have KARINA here, likely post-renal. Seen by pulmonary as well. Per grandson, patient also with AMS, slow to respond to them for 2 days. Palliative care consulted for ongoing GOC.
82F with PMH of gallbladder cancer with liver mets, breast CA s/p mastectomy, HTN, HLD, here after recent treatment for PE, with dyspnea. Seen by palliative on last admission, no major symptoms, goals were for continued treatment at the time. Noted to have KARINA here, likely post-renal. Seen by pulmonary as well. Per grandson, patient also with AMS, slow to respond to them for 2 days. Palliative care consulted for ongoing GOC.

## 2023-01-27 NOTE — PROGRESS NOTE ADULT - PROBLEM SELECTOR PLAN 4
- hospice note reviewed - family opting for hospice at [facility]  - current care for now, including morphine 2mg IV
- hospice note reviewed - family opting for hospice at [facility]  - current care for now

## 2023-01-27 NOTE — PROGRESS NOTE ADULT - PROBLEM SELECTOR PLAN 2
- continues to be altered  - monitor mental status
- continues to be altered  - monitor mental status

## 2023-01-27 NOTE — DISCHARGE NOTE PROVIDER - NSDCMRMEDTOKEN_GEN_ALL_CORE_FT
aluminum hydroxide-magnesium hydroxide 200 mg-200 mg/5 mL oral suspension: 30 milliliter(s) orally every 4 hours, As needed, Dyspepsia  apixaban 5 mg oral tablet: 1 tab(s) orally every 12 hours   atorvastatin 10 mg oral tablet: 1 tab(s) orally once a day (at bedtime)  bisacodyl 5 mg oral delayed release tablet: 1 tab(s) orally once a day, As needed, Constipation  Lasix 20 mg oral tablet: 1 tab(s) orally 2 times a day   losartan 50 mg oral tablet: 1 tab(s) orally once a day  meclizine 25 mg oral tablet: 1 tab(s) orally once a day, As Needed  memantine 10 mg oral tablet: 1 tab(s) orally 2 times a day  polyethylene glycol 3350 oral powder for reconstitution: 17 gram(s) orally once a day  senna leaf extract oral tablet: 2 tab(s) orally once a day (at bedtime)  Vascepa 1 g oral capsule: 2 cap(s) orally 2 times a day   bisacodyl 5 mg oral delayed release tablet: 1 tab(s) orally once a day, As needed, Constipation  meclizine 25 mg oral tablet: 1 tab(s) orally once a day, As Needed  polyethylene glycol 3350 oral powder for reconstitution: 17 gram(s) orally once a day  senna leaf extract oral tablet: 2 tab(s) orally once a day (at bedtime)   Roxanol 20 mg/mL oral concentrate: 0.5 milliliter(s) orally every 4 hours, As Needed pain

## 2023-01-27 NOTE — DISCHARGE NOTE NURSING/CASE MANAGEMENT/SOCIAL WORK - PATIENT PORTAL LINK FT
You can access the FollowMyHealth Patient Portal offered by [facility] by registering at the following website: [email or web address]. By joining [email or web address]’s FollowMyHealth portal, you will also be able to view your health information using other applications (apps) compatible with our system.

## 2023-01-27 NOTE — CHART NOTE - NSCHARTNOTEFT_GEN_A_CORE
Pt seen and examined at bedside with family. Daughter agreeable to CMO only at this point. All medications stopped except morphine. Bedavailable at hospice. Family aware death is imminent over next week but is stable as of now for dc to SNF. They are in agreeable for dc to hospice.

## 2023-01-27 NOTE — PROGRESS NOTE ADULT - PROBLEM SELECTOR PLAN 5
- planned d/c to hospice  ______________  Bharath Eden MD  Palliative Medicine  [facility]   of Geriatric and Palliative Medicine  [phone]

## 2023-01-27 NOTE — DISCHARGE NOTE PROVIDER - HOSPITAL COURSE
HPI:  82 year old Female known to have GB cancer with Liver mets, breast cancer s/p mastectomy, HTN, HLD, and discharged 1 week ago after being treated for PE and LLE was brought into the ER for sudden onset of  shortness of breath and low SpO2 of 88%.   History was provided by the patient's daughter (Pratima) who reports that her mother has been worsening since she got the liver biopsy on the 18th of January.  Patient had worsening Lower extremity edema, and increasing abdominal girth over the past 3 days associated with decreasing urine output and darker colored urine.  Also the daughter expressed concern that her mother oral intake has decreased.  No fever, no chill, no nausea, no vomiting , no diarrhea, no chest pain.    ED Course:  In ED, patient was found to be hypoxic and started on O2 4L/min by nasal canula with spO2 98%. HR: 98 BP: 97/54     CT-A chest showed Left pleural effusion, Old right sided PE and new left PE.  CT- abd+ pelvis showed mild ascites, moderate bilateral hydronephrosis with markedly distended urinary bladder, and diffuse hepatic metastases. Irregular thickened  gallbladder wall consistent with given history of gallbladder carcinoma.    Blood tests showed Leucocytosis, macrocytic anemia Hgb 82. .6, KARINA with creatinine of 2.6, elevated cholestatic LFTs, elevated trop and positive urinalysis.  A Logan was inserted for urinary retention and patient was started on cefepime for UTI.    patient will be admitted for telemetry for treatement of Acute PE with tryptonemia and UTI with KARINA and hydronephrosis.    GOC where also discussed with patient's daughter(Pratima, health care proxy) who understands the patient's poor general prognosis and confirms that the patient's code status is DNR and DNI, however she would like to give her mother a chance to get chemotherapy.    Floor Course:  Patient was evaluated by Medicine, Critical Care, Hematology Oncology, Gastroenterology, Palliative Care, and Hospice.    #Dyspnea due to pulmonary embolism  - currently on heparin, family wants to continue while in the hospital, informed that it may be stopped at hospice  - was on Eliquis as outpt, likely failure of Eliquis    #Acute on chronic anemia  - pt's hgb currently 6.8  - no obvious source of bleeding  - HESHAM declined  - patient has active T&S 1/26/23  - family declining blood transfusion at this time    #UTI  #Urinary retention   #KARINA (post renal)? with HAGMA   # bilateral hydronephrosis   - Logan draining dark urine  - D5 w/ Bicarb drip started   - poor prognosis    #Gallbladder carcinoma with hepatic mets  #Hx of Breast cancer s/p mastectomy   #Obstructive Jaundice  #Ascites and peritoneal lymphadenopathy  - DNR/DNI  - NO further chemo offered by heme/onc  - hospice recalled, family has agreed    #Essential Hypertension    - controlled HPI:  82 year old Female known to have GB cancer with Liver mets, breast cancer s/p mastectomy, HTN, HLD, and discharged 1 week ago after being treated for PE and LLE was brought into the ER for sudden onset of  shortness of breath and low SpO2 of 88%.   History was provided by the patient's daughter (Pratima) who reports that her mother has been worsening since she got the liver biopsy on the 18th of January.  Patient had worsening Lower extremity edema, and increasing abdominal girth over the past 3 days associated with decreasing urine output and darker colored urine.  Also the daughter expressed concern that her mother oral intake has decreased.  No fever, no chill, no nausea, no vomiting , no diarrhea, no chest pain.    ED Course:  In ED, patient was found to be hypoxic and started on O2 4L/min by nasal canula with spO2 98%. HR: 98 BP: 97/54     CT-A chest showed Left pleural effusion, Old right sided PE and new left PE.  CT- abd+ pelvis showed mild ascites, moderate bilateral hydronephrosis with markedly distended urinary bladder, and diffuse hepatic metastases. Irregular thickened  gallbladder wall consistent with given history of gallbladder carcinoma.    Blood tests showed Leucocytosis, macrocytic anemia Hgb 82. .6, KARINA with creatinine of 2.6, elevated cholestatic LFTs, elevated trop and positive urinalysis.  A Logan was inserted for urinary retention and patient was started on cefepime for UTI.    patient will be admitted for telemetry for treatement of Acute PE with tryptonemia and UTI with KARINA and hydronephrosis.    GOC where also discussed with patient's daughter(Pratima, health care proxy) who understands the patient's poor general prognosis and confirms that the patient's code status is DNR and DNI, however she would like to give her mother a chance to get chemotherapy.

## 2023-01-27 NOTE — PROGRESS NOTE ADULT - PROVIDER SPECIALTY LIST ADULT
Heme/Onc
Internal Medicine
Palliative Care
Palliative Care
Hospitalist
Hospitalist
Internal Medicine

## 2023-01-27 NOTE — DISCHARGE NOTE PROVIDER - NSDCCPCAREPLAN_GEN_ALL_CORE_FT
PRINCIPAL DISCHARGE DIAGNOSIS  Diagnosis: Pulmonary embolism  Assessment and Plan of Treatment: A pulmonary embolism (PE) is a sudden blockage or decrease of blood flow in one or both lungs that happens when a clot travels into the arteries of the lung (pulmonary arteries). Most blockages come from a blood clot that forms in the vein of a leg or arm (deep vein thrombosis, DVT) and travels to the lungs. A clot is blood that has thickened into a gel or solid. PE is a dangerous and life-threatening condition that needs to be treated right away.  In the hospital your PE was treated with an anticoagulating medication called heparin. Due to your cancer, you are at increased risk for additional PE's in the future. On discharge to hospice, all medications that extend life are generally stopped, although you should be allowed medication that can continue to keep you comfortable.      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   Treatment on discharge will be to control symptoms and make you as comfortable as possible. Please take pain medications as prescribed.    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    Diagnosis: Acute-on-chronic kidney injury  Assessment and Plan of Treatment: This finding is likly due to decreased intake of fluids by mouth and subsequent dehydration. This can also lead to decreased urine production.    Diagnosis: Leukocytosis  Assessment and Plan of Treatment: This finding is possibly due to a UTI. You were treated with the antibiotic cefepime while you were in the hospital.     PRINCIPAL DISCHARGE DIAGNOSIS  Diagnosis: Hospice care  Assessment and Plan of Treatment: You are discharged to [facility] under the hospice. All antibiotics and blood thinners were stopped.      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    Diagnosis: Acute-on-chronic kidney injury  Assessment and Plan of Treatment: This finding is likly due to decreased intake of fluids by mouth and subsequent dehydration. This can also lead to decreased urine production.    Diagnosis: Pulmonary embolism  Assessment and Plan of Treatment: A pulmonary embolism (PE) is a sudden blockage or decrease of blood flow in one or both lungs that happens when a clot travels into the arteries of the lung (pulmonary arteries). Most blockages come from a blood clot that forms in the vein of a leg or arm (deep vein thrombosis, DVT) and travels to the lungs. A clot is blood that has thickened into a gel or solid. PE is a dangerous and life-threatening condition that needs to be treated right away.  In the hospital your PE was treated with an anticoagulating medication called heparin. Due to your cancer, you are at increased risk for additional PE's in the future. On discharge to hospice, all medications that extend life are generally stopped, although you should be allowed medication that can continue to keep you comfortable.    Diagnosis: Leukocytosis  Assessment and Plan of Treatment: This finding is possibly due to a UTI. You were treated with the antibiotic cefepime while you were in the hospital.    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   Treatment on discharge will be to control symptoms and make you as comfortable as possible. Please take pain medications as prescribed.

## 2023-01-27 NOTE — PROGRESS NOTE ADULT - SUBJECTIVE AND OBJECTIVE BOX
Pt seen and examined. Pt calm, daughter at bedside    T(F): , Max: 98.9 (01-23-23 @ 21:10)  HR: 87 (01-24-23 @ 16:21) (83 - 89)  BP: 89/50 (01-24-23 @ 16:21)  RR: 18 (01-24-23 @ 16:21)  SpO2: 97% (01-24-23 @ 16:21)  General: No apparent distress  Cardiovascular: S1, S2  Gastrointestinal: Soft, Non-tender, Non-distended  Respiratory: Good air entry bilaterally  Musculoskeletal: Moves all extremities  Lymphatic: No edema  Neurologic: No gross motor deficit  Dermatologic: Skin dry  PT/INR - ( 24 Jan 2023 07:53 )   PT: 23.30 sec;   INR: 2.00 ratio         PTT - ( 24 Jan 2023 17:23 )  PTT:95.3 sec                        7.5    15.80 )-----------( 279      ( 24 Jan 2023 17:23 )             23.5     01-24    137  |  103  |  129<HH>  ----------------------------<  135<H>  5.6<H>   |  15<L>  |  3.6<H>    Ca    8.3<L>      24 Jan 2023 07:53  Phos  8.5     01-24  Mg     2.9     01-24    TPro  4.9<L>  /  Alb  2.4<L>  /  TBili  5.2<H>  /  DBili  x   /  AST  357<H>  /  ALT  189<H>  /  AlkPhos  630<H>  01-24      Culture - Blood (collected 23 Jan 2023 04:10)  Source: .Blood Blood  Preliminary Report (24 Jan 2023 18:02):    No growth to date.    Culture - Blood (collected 23 Jan 2023 04:10)  Source: .Blood Blood  Preliminary Report (24 Jan 2023 18:02):    No growth to date.    Culture - Urine (collected 23 Jan 2023 04:10)  Source: Clean Catch Clean Catch (Midstream)  Preliminary Report (24 Jan 2023 16:54):    >100,000 CFU/ml Escherichia coli    
LENGTH OF HOSPITAL STAY: 1d    CHIEF COMPLAINT:   Patient is a 82y old  Female who presents with a chief complaint of Shortness of breath (2023 17:32)      HISTORY OF PRESENTING ILLNESS:    HPI:  82 year old Female known to have GB cancer with Liver mets, breast cancer s/p mastectomy, HTN, HLD, and discharged 1 week ago after being treated for PE and LLE was brought into the ER for sudden onset of  shortness of breath and low SpO2 of 88%.   History was provided by the patient's daughter (Nenita) who reports that her mother has been worsening since she got the liver biopsy on the .  Patient had worsening Lower extremity edema, and increasing abdominal girth over the past 3 days associated with decreasing urine output and darker colored urine.  Also the daughter expressed concern that her mother oral intake has decreased.  No fever, no chill, no nausea, no vomiting , no diarrhea, no chest pain.    In ED, patient was found to be hypoxic and started on O2 4L/min by nasal canula with spO2 98%. HR: 98 BP: 97/54     CT-A chest showed Left pleural effusion, Old right sided PE and new left PE.  CT- abd+ pelvis showed mild ascites, moderate bilateral hydronephrosis with markedly distended urinary bladder, and diffuse hepatic metastases. Irregular thickened  gallbladder wall consistent with given history of gallbladder carcinoma.    Blood tests showed Leuckocytosis, macrtocytic anemia Hgb 82. .6, Karina with creatinin of 2.6, elevated cholestatic LFTs, elevated trop and positive urinalysis.  A farris was inserted for urinary retention and patient was started on cefepime for UTI.    patient will be admitted for telemetry for treatement of Acute PE with tryptonemia and UTI with KARINA and hydronephrosis.    GOC where also discussed with patient's daughter(Nenita, health care proxy) who understands the patient's poor general prognosis and confirms that the patient's code status is DNR and DNI, however she would like to give her mother a chance to get chemotherapy. (2023 08:43)    PAST MEDICAL & SURGICAL HISTORY  PAST MEDICAL & SURGICAL HISTORY:  Gallbladder neoplasm      Liver metastases      Hypertension      Pulmonary embolism      S/P mastectomy        SOCIAL HISTORY:    ALLERGIES:  aspirin (Unknown)  penicillin (Unknown)    MEDICATIONS:  STANDING MEDICATIONS  atorvastatin 10 milliGRAM(s) Oral at bedtime  cefepime   IVPB 500 milliGRAM(s) IV Intermittent every 12 hours  heparin   Injectable 3500 Unit(s) IV Push once  heparin  Infusion.  Unit(s)/Hr IV Continuous <Continuous>  HYDROmorphone   Tablet 2 milliGRAM(s) Oral once  memantine 10 milliGRAM(s) Oral two times a day  pantoprazole    Tablet 40 milliGRAM(s) Oral before breakfast  polyethylene glycol 3350 17 Gram(s) Oral daily  senna 2 Tablet(s) Oral at bedtime    PRN MEDICATIONS  aluminum hydroxide/magnesium hydroxide/simethicone Suspension 30 milliLiter(s) Oral every 4 hours PRN  bisacodyl 5 milliGRAM(s) Oral at bedtime PRN  diphenhydrAMINE 50 milliGRAM(s) Oral every 4 hours PRN  heparin   Injectable 3500 Unit(s) IV Push every 6 hours PRN  heparin   Injectable 1500 Unit(s) IV Push every 6 hours PRN  meclizine 25 milliGRAM(s) Oral daily PRN  oxyCODONE    IR 5 milliGRAM(s) Oral every 8 hours PRN    VITALS:   T(F): 97.4  HR: 87  BP: 89/50  RR: 18  SpO2: 97%    LABS:                        7.7    16.45 )-----------( 277      ( 2023 07:53 )             24.2         137  |  103  |  129<HH>  ----------------------------<  135<H>  5.6<H>   |  15<L>  |  3.6<H>    Ca    8.3<L>      2023 07:53  Phos  8.5       Mg     2.9         TPro  4.9<L>  /  Alb  2.4<L>  /  TBili  5.2<H>  /  DBili  x   /  AST  357<H>  /  ALT  189<H>  /  AlkPhos  630<H>      PT/INR - ( 2023 07:53 )   PT: 23.30 sec;   INR: 2.00 ratio         PTT - ( 2023 10:05 )  PTT:94.3 sec  Urinalysis Basic - ( 2023 04:10 )    Color: Gabriela / Appearance: Turbid / S.022 / pH: x  Gluc: x / Ketone: Negative  / Bili: Negative / Urobili: 3 mg/dL   Blood: x / Protein: 30 mg/dL / Nitrite: Negative   Leuk Esterase: Large / RBC: 178 /HPF / WBC >720 /HPF   Sq Epi: x / Non Sq Epi: 3 /HPF / Bacteria: Many        Troponin T, Serum: 0.06 ng/mL *HH* (23 @ 07:53)      Culture - Urine (collected 2023 04:10)  Source: Clean Catch Clean Catch (Midstream)  Preliminary Report (2023 16:54):    >100,000 CFU/ml Escherichia coli      CARDIAC MARKERS ( 2023 07:53 )  x     / 0.06 ng/mL / x     / x     / x      CARDIAC MARKERS ( 2023 05:38 )  x     / 0.05 ng/mL / x     / x     / x      CARDIAC MARKERS ( 2023 21:23 )  x     / 0.05 ng/mL / x     / x     / x          RADIOLOGY:    PHYSICAL EXAM:  GEN: weal,frail,jaundiced  HEENT: AT, AISHA  LUNGS: Clear to auscultation bilaterally   HEART: S1/S2 present. RRR.   ABD: Soft, non-tender, non-distended. Bowel sounds present  EXT: edema, cyanosis, clubbing absent  NEURO: AAOX1    
SUBJECTIVE:    Patient is a 82y old Female who presents with a chief complaint of Shortness of breath (24 Jan 2023 20:13)    Currently admitted to medicine with the primary diagnosis of Abdominal pain       Today is hospital day 2d. BMP reviewed , Started Bicarb drip , and on heparin drip  PAST MEDICAL & SURGICAL HISTORY  Gallbladder neoplasm    Liver metastases    Hypertension    Pulmonary embolism    S/P mastectomy      SOCIAL HISTORY:  Negative for smoking/alcohol/drug use.     ALLERGIES:  aspirin (Unknown)  penicillin (Unknown)    MEDICATIONS:  STANDING MEDICATIONS  atorvastatin 10 milliGRAM(s) Oral at bedtime  cefepime   IVPB 500 milliGRAM(s) IV Intermittent every 12 hours  dextrose 10%. 1000 milliLiter(s) IV Continuous <Continuous>  dextrose 5% 1000 milliLiter(s) IV Continuous <Continuous>  heparin   Injectable 3500 Unit(s) IV Push once  heparin  Infusion.  Unit(s)/Hr IV Continuous <Continuous>  memantine 10 milliGRAM(s) Oral two times a day  pantoprazole    Tablet 40 milliGRAM(s) Oral before breakfast  polyethylene glycol 3350 17 Gram(s) Oral daily  senna 2 Tablet(s) Oral at bedtime    PRN MEDICATIONS  aluminum hydroxide/magnesium hydroxide/simethicone Suspension 30 milliLiter(s) Oral every 4 hours PRN  bisacodyl 5 milliGRAM(s) Oral at bedtime PRN  diphenhydrAMINE 50 milliGRAM(s) Oral every 4 hours PRN  heparin   Injectable 3500 Unit(s) IV Push every 6 hours PRN  heparin   Injectable 1500 Unit(s) IV Push every 6 hours PRN  meclizine 25 milliGRAM(s) Oral daily PRN  oxyCODONE    IR 5 milliGRAM(s) Oral every 8 hours PRN    VITALS:   T(F): 97.3  HR: 86  BP: 101/53  RR: 18  SpO2: 97%    PHYSICAL EXAM:  GEN: No acute distress  LUNGS: Clear to auscultation bilaterally   HEART: S1/S2 present.   ABD: Soft, non-tender, non-distended. Bowel sounds present         LABS:                        7.0    16.63 )-----------( 242      ( 25 Jan 2023 06:25 )             21.8     01-25    136  |  102  |  144<HH>  ----------------------------<  174<H>  5.7<H>   |  17  |  4.6<HH>    Ca    7.7<L>      25 Jan 2023 06:25  Phos  9.3     01-25  Mg     2.8     01-25    TPro  5.2<L>  /  Alb  2.5<L>  /  TBili  5.9<H>  /  DBili  x   /  AST  368<H>  /  ALT  206<H>  /  AlkPhos  615<H>  01-25    PT/INR - ( 24 Jan 2023 07:53 )   PT: 23.30 sec;   INR: 2.00 ratio         PTT - ( 25 Jan 2023 06:25 )  PTT:76.4 sec            Culture - Blood (collected 23 Jan 2023 04:10)  Source: .Blood Blood  Preliminary Report (24 Jan 2023 18:02):    No growth to date.    Culture - Blood (collected 23 Jan 2023 04:10)  Source: .Blood Blood  Preliminary Report (24 Jan 2023 18:02):    No growth to date.    Culture - Urine (collected 23 Jan 2023 04:10)  Source: Clean Catch Clean Catch (Midstream)  Preliminary Report (24 Jan 2023 16:54):    >100,000 CFU/ml Escherichia coli      CARDIAC MARKERS ( 24 Jan 2023 07:53 )  x     / 0.06 ng/mL / x     / x     / x            RADIOLOGY:      
SUBJECTIVE:    Patient is a 82y old Female who presents with a chief complaint of Shortness of breath (26 Jan 2023 13:24)    Currently admitted to medicine with the primary diagnosis of Abdominal pain    Today is hospital day 3d. This morning she is lying in bed with daughter at the bedside. Daughter says patient has been complaining to her of itchiness and that she has developed a new rash that has covered her body since she has been in the hospital. Patient was awake and alert and would respond to her name and simple commands translated by her daughter.     At this time, the daughter would like all medical management done for her mother.    ADDENDUM: Later in the day, the daughter said she would like to speak to her  about how medical management will move forward, and seemed unsure about additional tests and transfusions. Could not get in contact with daughter by phone later in the day for follow up.    PAST MEDICAL & SURGICAL HISTORY  Gallbladder neoplasm    Liver metastases    Hypertension    Pulmonary embolism    S/P mastectomy      SOCIAL HISTORY:  Negative for smoking/alcohol/drug use.     ALLERGIES:  aspirin (Unknown)  penicillin (Unknown)    MEDICATIONS:  STANDING MEDICATIONS  atorvastatin 10 milliGRAM(s) Oral at bedtime  cefepime   IVPB 500 milliGRAM(s) IV Intermittent every 12 hours  dextrose 10%. 1000 milliLiter(s) IV Continuous <Continuous>  dextrose 5% 1000 milliLiter(s) IV Continuous <Continuous>  heparin   Injectable 3500 Unit(s) IV Push once  heparin  Infusion.  Unit(s)/Hr IV Continuous <Continuous>  memantine 10 milliGRAM(s) Oral two times a day  pantoprazole    Tablet 40 milliGRAM(s) Oral before breakfast  polyethylene glycol 3350 17 Gram(s) Oral daily  senna 2 Tablet(s) Oral at bedtime    PRN MEDICATIONS  aluminum hydroxide/magnesium hydroxide/simethicone Suspension 30 milliLiter(s) Oral every 4 hours PRN  bisacodyl 5 milliGRAM(s) Oral at bedtime PRN  diphenhydrAMINE 50 milliGRAM(s) Oral every 4 hours PRN  heparin   Injectable 3500 Unit(s) IV Push every 6 hours PRN  heparin   Injectable 1500 Unit(s) IV Push every 6 hours PRN  meclizine 25 milliGRAM(s) Oral daily PRN  oxyCODONE    IR 5 milliGRAM(s) Oral every 8 hours PRN    VITALS:   T(F): 96.8  HR: 81  BP: 99/50  RR: 18  SpO2: 96%    LABS:                        6.8    15.55 )-----------( 199      ( 26 Jan 2023 07:13 )             20.7     01-26    145  |  102  |  157<HH>  ----------------------------<  181<H>  5.6<H>   |  17  |  5.4<HH>    Ca    8.0<L>      26 Jan 2023 07:13  Phos  9.6     01-26  Mg     2.9     01-26    TPro  4.9<L>  /  Alb  2.3<L>  /  TBili  8.2<H>  /  DBili  x   /  AST  303<H>  /  ALT  190<H>  /  AlkPhos  627<H>  01-26    PTT - ( 26 Jan 2023 07:13 )  PTT:94.2 sec              RADIOLOGY:    PHYSICAL EXAM:  GEN: patient looks uncomfortable, jaundiced, emaciated NC 2.5L, Logan with dark urine  LUNGS: Clear to auscultation bilaterally   HEART: S1/S2 present. RRR.   ABD: Hard, distended, hepatosplenomegaly, reduced Bowel sounds  EXT: Patient has pruritic rash across all extremities, core, and face; normal extremity pulses  NEURO: AAOX2    
CHIEF COMPLAINT:    Patient is a 82y old  Female who presents with a chief complaint of ABDOMINAL PAIN;ELEVATED LIVER ENZYMES;ACUTE-ON-CHRONIC KIDNEY INJURY;...     (26 Jan 2023 13:07)      INTERVAL HPI/OVERNIGHT EVENTS:    Patient seen and examined at bedside. No acute overnight events occurred.    ROS: Unable to obtain.    Medications:  Standing  atorvastatin 10 milliGRAM(s) Oral at bedtime  cefepime   IVPB 500 milliGRAM(s) IV Intermittent every 12 hours  dextrose 10%. 1000 milliLiter(s) IV Continuous <Continuous>  dextrose 5% 1000 milliLiter(s) IV Continuous <Continuous>  heparin   Injectable 3500 Unit(s) IV Push once  heparin  Infusion.  Unit(s)/Hr IV Continuous <Continuous>  memantine 10 milliGRAM(s) Oral two times a day  pantoprazole    Tablet 40 milliGRAM(s) Oral before breakfast  polyethylene glycol 3350 17 Gram(s) Oral daily  senna 2 Tablet(s) Oral at bedtime  sodium zirconium cyclosilicate 10 Gram(s) Oral once    PRN Meds  aluminum hydroxide/magnesium hydroxide/simethicone Suspension 30 milliLiter(s) Oral every 4 hours PRN  bisacodyl 5 milliGRAM(s) Oral at bedtime PRN  diphenhydrAMINE 50 milliGRAM(s) Oral every 4 hours PRN  heparin   Injectable 3500 Unit(s) IV Push every 6 hours PRN  heparin   Injectable 1500 Unit(s) IV Push every 6 hours PRN  meclizine 25 milliGRAM(s) Oral daily PRN  oxyCODONE    IR 5 milliGRAM(s) Oral every 8 hours PRN        Vital Signs:    T(F): 97.2 (01-26-23 @ 05:17), Max: 98.1 (01-25-23 @ 17:54)  HR: 84 (01-26-23 @ 05:17) (82 - 89)  BP: 114/58 (01-26-23 @ 05:17) (102/53 - 114/58)  RR: 18 (01-26-23 @ 05:17) (18 - 20)  SpO2: 96% (01-26-23 @ 05:17) (96% - 99%)  I&O's Summary    25 Jan 2023 07:01  -  26 Jan 2023 07:00  --------------------------------------------------------  IN: 0 mL / OUT: 100 mL / NET: -100 mL        PHYSICAL EXAM:  GENERAL:  NAD, anasarca  SKIN: faint flat rash  HEENT: Atraumatic. Normocephalic. Anicteric  NECK:  No JVD.   PULMONARY: Clear to ausculation bilaterally. No wheezing. No rales  CVS: Normal S1, S2. Regular rate and rhythm. No murmurs.  ABDOMEN/GI: Soft, Nontender, Nondistended; Bowel sounds are present  EXTREMITIES:  edematous  NEUROLOGIC:  Non particiaptory  PSYCH: Alert & oriented x 3, normal affect      LABS:                        6.8    15.55 )-----------( 199      ( 26 Jan 2023 07:13 )             20.7     01-26    145  |  102  |  157<HH>  ----------------------------<  181<H>  5.6<H>   |  17  |  5.4<HH>    Ca    8.0<L>      26 Jan 2023 07:13  Phos  9.6     01-26  Mg     2.9     01-26    TPro  4.9<L>  /  Alb  2.3<L>  /  TBili  8.2<H>  /  DBili  x   /  AST  303<H>  /  ALT  190<H>  /  AlkPhos  627<H>  01-26    PTT - ( 26 Jan 2023 07:13 )  PTT:94.2 sec  Serum Pro-Brain Natriuretic Peptide: 1372 pg/mL (01-22-23 @ 21:23)    Trop 0.06, CKMB --, CK --, 01-24-23 @ 07:53        RADIOLOGY & ADDITIONAL TESTS:  Imaging or report Personally Reviewed:  [ ] YES  [ ] NO -->no new images      Consultant(s) Notes Reviewed:  [ ] YES  [ ] NO  Care Discussed with Consultants/Other Providers [ ] YES  [ ] NO    Case discussed with resident  Care discussed with pt        
HPI: 82F with PMH of gallbladder cancer with liver mets, breast CA s/p mastectomy, HTN, HLD, here after recent treatment for PE, with dyspnea. Seen by palliative on last admission, no major symptoms, goals were for continued treatment at the time. Noted to have KARINA here, likely post-renal. Seen by pulmonary as well. Per grandson, patient also with AMS, slow to respond to them for 2 days. Palliative care consulted for ongoing GOC.    INTERVAL EVENTS:  1/25: patient comfortable, daughter at bedside    ADVANCE DIRECTIVES:    [ ] Full Code [X] DNR  MOLST  [ ]  Living Will  [ ]   DECISION MAKER(s):  [ ] Health Care Proxy(s)  [ ] Surrogate(s)  [ ] Guardian           Name(s): Phone Number(s):    BASELINE (I)ADL(s) (prior to admission):  [place]: [ ]Total  [ ] Moderate [ ]Dependent  Palliative Performance Status Version 2:         %    http://[email or web address]/files/news/palliative_performance_scale_[email or web address].pdf    Allergies    aspirin (Unknown)  penicillin (Unknown)    Intolerances    MEDICATIONS  (STANDING):  atorvastatin 10 milliGRAM(s) Oral at bedtime  cefepime   IVPB 500 milliGRAM(s) IV Intermittent every 12 hours  dextrose 10%. 1000 milliLiter(s) (250 mL/Hr) IV Continuous <Continuous>  dextrose 5% 1000 milliLiter(s) (75 mL/Hr) IV Continuous <Continuous>  heparin   Injectable 3500 Unit(s) IV Push once  heparin  Infusion.  Unit(s)/Hr (8 mL/Hr) IV Continuous <Continuous>  memantine 10 milliGRAM(s) Oral two times a day  pantoprazole    Tablet 40 milliGRAM(s) Oral before breakfast  polyethylene glycol 3350 17 Gram(s) Oral daily  senna 2 Tablet(s) Oral at bedtime    MEDICATIONS  (PRN):  aluminum hydroxide/magnesium hydroxide/simethicone Suspension 30 milliLiter(s) Oral every 4 hours PRN Dyspepsia  bisacodyl 5 milliGRAM(s) Oral at bedtime PRN Constipation  diphenhydrAMINE 50 milliGRAM(s) Oral every 4 hours PRN Rash and/or Itching  heparin   Injectable 3500 Unit(s) IV Push every 6 hours PRN For aPTT less than 40  heparin   Injectable 1500 Unit(s) IV Push every 6 hours PRN For aPTT between 40 - 57  meclizine 25 milliGRAM(s) Oral daily PRN Dizziness  oxyCODONE    IR 5 milliGRAM(s) Oral every 8 hours PRN Severe Pain (7 - 10)      PRESENT SYMPTOMS: [X ]Unable to obtain due to poor mentation   Source if other than patient:  [ ]Family   [ ]Team     Pain: [ ]yes [ ]no  QOL impact -   Location -                    Aggravating factors -  Quality -  Radiation -  Timing-  Severity (0-10 scale):  Minimal acceptable level (0-10 scale):     CPOT:    https://[email or web address]/getattachment/[email or web address]/Critical-Care-Pain-Observation-Tool-(CPOT)      PAIN AD Score:     [email or web address]/painad.pdf (press ctrl +  left click to view)    Dyspnea:                           [ ]Mild [ ]Moderate [ ]Severe  Anxiety:                             [ ]Mild [ ]Moderate [ ]Severe  Fatigue:                             [ ]Mild [ ]Moderate [ ]Severe  Nausea:                             [ ]Mild [ ]Moderate [ ]Severe  Loss of appetite:              [ ]Mild [ ]Moderate [ ]Severe  Constipation:                    [ ]Mild [ ]Moderate [ ]Severe    Other Symptoms:  [ ]All other review of systems negative     Palliative Performance Status Version 2:         %    http://npcrc.org/files/news/palliative_performance_scale_ppsv2.pdf  PHYSICAL EXAM:  Vital Signs Last 24 Hrs  T(C): 36.4 (25 Jan 2023 16:20), Max: 36.6 (25 Jan 2023 00:04)  T(F): 97.6 (25 Jan 2023 16:20), Max: 97.8 (25 Jan 2023 00:04)  HR: 82 (25 Jan 2023 16:20) (82 - 89)  BP: 102/53 (25 Jan 2023 16:20) (101/53 - 110/55)  BP(mean): --  RR: 18 (25 Jan 2023 16:20) (18 - 18)  SpO2: 99% (25 Jan 2023 16:20) (96% - 99%)    Parameters below as of 25 Jan 2023 16:20  Patient On (Oxygen Delivery Method): nasal cannula    GENERAL:  [X ] No acute distress [ ]Lethargic  [ ]Unarousable  [ ]Verbal  [ ]Non-Verbal [ ]Cachexia    BEHAVIORAL/PSYCH:  [ ]Alert and Oriented x  [ ] Anxiety [ ] Delirium [ ] Agitation [ X] Calm   EYES: [ ] No scleral icterus [ ] Scleral icterus [X ] Closed  ENMT:  [ ]Dry mouth  [ ]No oral lesions [X ] No external ear or nose lesions  CARDIOVASCULAR:  [ ]Regular [ ]Irregular [ ]Tachy [ ]Not Tachy  [ ]Ulises [ ] Edema  PULMONARY:  [ ]Tachypnea  [ ]Audible excessive secretions [ X] No labored breathing [ ] labored breathing  GASTROINTESTINAL: [ ]Soft  [ ]Distended  [ X] Not distended [ ]Non tender [ ]Tender    MUSCULOSKELETAL: [ ]No clubbing [ ] clubbing  [ ] No cyanosis [ ] cyanosis  NEUROLOGIC: [ ]No focal deficits [ ] Follows commands [ ] Does not follow commands  [X ]Cognitive impairment  [ ]Dysphagia [ ]Dysarthria [ ]Paresis   GENITOURINARY: [ ]Normal [ ] Incontinent   [ ]Oliguria/Anuria   [ ]Logan  SKIN: [ ] Jaundiced [ X] Non-jaundiced [ ]Rash [ ]No Rash [ ] Warm [ ] Dry  MISC/LINES: [ ] ET tube [ ] Trach [ ]NGT/OGT [ ]PEG [ ]Logan    CRITICAL CARE:  [ ] Shock Present  [ ]Septic [ ]Cardiogenic [ ]Neurologic [ ]Hypovolemic  [ ]  Vasopressors [ ]  Inotropes   [ ]Respiratory failure present [ ]Mechanical ventilation [ ]Non-invasive ventilatory support [ ]High flow  [ ]Acute  [ ]Chronic [ ]Hypoxic  [ ]Hypercarbic [ ]Other  [ ]Other organ failure     LABS: reviewed                          7.0    16.63 )-----------( 242      ( 25 Jan 2023 06:25 )             21.8     01-25    136  |  102  |  144<HH>  ----------------------------<  174<H>  5.7<H>   |  17  |  4.6<HH>    Ca    7.7<L>      25 Jan 2023 06:25  Phos  9.3     01-25  Mg     2.8     01-25      RADIOLOGY & ADDITIONAL STUDIES:  CT head  No evidence of intracranial hemorrhage, territorial infarct, or mass   effect.    PROTEIN CALORIE MALNUTRITION PRESENT: [ ]mild [ ]moderate [ ]severe [ ]underweight [ ]morbid obesity  https://www.andeal.org/vault/2440/web/files/ONC/Table_Clinical%20Characteristics%20to%20Document%20Malnutrition-White%20JV%20et%20al%202012.pdf    Height (cm): 160 (01-22-23 @ 19:17), 160 (01-11-23 @ 18:00)  Weight (kg): 54.5 (01-11-23 @ 18:00)  BMI (kg/m2): 21.3 (01-22-23 @ 19:17), 21.3 (01-11-23 @ 18:00)    [ ]PPSV2 < or = to 30% [ ]significant weight loss  [ ]poor nutritional intake  [ ]anasarca      [ ]Artificial Nutrition      REFERRALS:   [ ]Chaplaincy  [ ]Hospice  [ ]Child Life  [ ]Social Work  [ ]Case management [ ]Holistic Therapy     Goals of Care Document:     ______________  Bharath Eden MD  Palliative Medicine  [facility]   of Geriatric and Palliative Medicine  [phone]    
HPI: 82F with PMH of gallbladder cancer with liver mets, breast CA s/p mastectomy, HTN, HLD, here after recent treatment for PE, with dyspnea. Seen by palliative on last admission, no major symptoms, goals were for continued treatment at the time. Noted to have KARINA here, likely post-renal. Seen by pulmonary as well. Per grandson, patient also with AMS, slow to respond to them for 2 days. Palliative care consulted for ongoing GOC.    INTERVAL EVENTS:  1/25: patient comfortable, daughter at bedside  1/27: patient comfortable, in bed, daughter and son-in-law at bedside    ADVANCE DIRECTIVES:    [ ] Full Code [X] DNR  MOLST  [ ]  Living Will  [ ]   DECISION MAKER(s):  [ ] Health Care Proxy(s)  [ ] Surrogate(s)  [ ] Guardian           Name(s): Phone Number(s):    BASELINE (I)ADL(s) (prior to admission):  [place]: [ ]Total  [ ] Moderate [ ]Dependent  Palliative Performance Status Version 2:         %    http://npcrc.org/files/news/palliative_performance_scale_ppsv2.pdf    Allergies    aspirin (Unknown)  penicillin (Unknown)    Intolerances    MEDICATIONS  (STANDING):  dextrose 10%. 1000 milliLiter(s) (250 mL/Hr) IV Continuous <Continuous>  dextrose 5% 1000 milliLiter(s) (75 mL/Hr) IV Continuous <Continuous>    MEDICATIONS  (PRN):  diphenhydrAMINE 50 milliGRAM(s) Oral every 4 hours PRN Rash and/or Itching  diphenhydrAMINE Injectable 50 milliGRAM(s) IntraMuscular every 6 hours PRN Rash and/or Itching  LORazepam   Injectable 0.5 milliGRAM(s) IV Push every 4 hours PRN Anxiety  morphine  - Injectable 2 milliGRAM(s) IV Push every 4 hours PRN Severe Pain (7 - 10)  morphine  - Injectable 2 milliGRAM(s) IV Push every 2 hours PRN Moderate pain (4-6), Severe pain (7-10), Respiratory rate greater than 22  oxyCODONE    IR 5 milliGRAM(s) Oral every 8 hours PRN Severe Pain (7 - 10)    PRESENT SYMPTOMS: [X ]Unable to obtain due to poor mentation   Source if other than patient:  [ ]Family   [ ]Team     Pain: [ ]yes [ ]no  QOL impact -   Location -                    Aggravating factors -  Quality -  Radiation -  Timing-  Severity (0-10 scale):  Minimal acceptable level (0-10 scale):     CPOT:    https://www.[facility] [email or web address]-([email or web address])      PAIN AD Score:     [email or web address] (press ctrl +  left click to view)    Dyspnea:                           [ ]Mild [ ]Moderate [ ]Severe  Anxiety:                             [ ]Mild [ ]Moderate [ ]Severe  Fatigue:                             [ ]Mild [ ]Moderate [ ]Severe  Nausea:                             [ ]Mild [ ]Moderate [ ]Severe  Loss of appetite:              [ ]Mild [ ]Moderate [ ]Severe  Constipation:                    [ ]Mild [ ]Moderate [ ]Severe    Other Symptoms:  [ ]All other review of systems negative     Palliative Performance Status Version 2:         %    [email or web address]/[email or web address]_[email or web address]    PHYSICAL EXAM:  Vital Signs Last 24 Hrs  T(C): 35.9 (27 Jan 2023 13:10), Max: 36.4 (26 Jan 2023 20:22)  T(F): 96.7 (27 Jan 2023 13:10), Max: 97.5 (26 Jan 2023 20:22)  HR: 82 (27 Jan 2023 13:10) (80 - 82)  BP: 102/51 (27 Jan 2023 13:10) (96/50 - 106/54)  BP(mean): 78 (26 Jan 2023 20:22) (78 - 78)  RR: 18 (27 Jan 2023 13:10) (18 - 18)  SpO2: 98% (27 Jan 2023 13:10) (97% - 98%)    Parameters below as of 27 Jan 2023 05:00  Patient On (Oxygen Delivery Method): nasal cannula      GENERAL:  [X ] No acute distress [ ]Lethargic  [ ]Unarousable  [ ]Verbal  [ ]Non-Verbal [ ]Cachexia    BEHAVIORAL/PSYCH:  [ ]Alert and Oriented x  [ ] Anxiety [ ] Delirium [ ] Agitation [ X] Calm   EYES: [ ] No scleral icterus [ ] Scleral icterus [X ] Closed  ENMT:  [ ]Dry mouth  [ ]No oral lesions [X ] No external ear or nose lesions  CARDIOVASCULAR:  [ ]Regular [ ]Irregular [ ]Tachy [ ]Not Tachy  [ ]Ulises [ ] Edema  PULMONARY:  [ ]Tachypnea  [ ]Audible excessive secretions [ X] No labored breathing [ ] labored breathing  GASTROINTESTINAL: [ ]Soft  [ ]Distended  [ X] Not distended [ ]Non tender [ ]Tender    MUSCULOSKELETAL: [ ]No clubbing [ ] clubbing  [ ] No cyanosis [ ] cyanosis  NEUROLOGIC: [ ]No focal deficits [ ] Follows commands [ ] Does not follow commands  [X ]Cognitive impairment  [ ]Dysphagia [ ]Dysarthria [ ]Paresis   GENITOURINARY: [ ]Normal [ ] Incontinent   [ ]Oliguria/Anuria   [ ]Logan  SKIN: [ ] Jaundiced [ X] Non-jaundiced [ ]Rash [ ]No Rash [ ] Warm [ ] Dry  MISC/LINES: [ ] ET tube [ ] Trach [ ]NGT/OGT [ ]PEG [ ]Logan    CRITICAL CARE:  [ ] Shock Present  [ ]Septic [ ]Cardiogenic [ ]Neurologic [ ]Hypovolemic  [ ]  Vasopressors [ ]  Inotropes   [ ]Respiratory failure present [ ]Mechanical ventilation [ ]Non-invasive ventilatory support [ ]High flow  [ ]Acute  [ ]Chronic [ ]Hypoxic  [ ]Hypercarbic [ ]Other  [ ]Other organ failure     LABS: reviewed                          6.8    15.55 )-----------( 199      ( 26 Jan 2023 07:13 )             20.7     01-26    145  |  102  |  157<HH>  ----------------------------<  181<H>  5.6<H>   |  17  |  5.4<HH>    Ca    8.0<L>      26 Jan 2023 07:13  Phos  9.6     01-26  Mg     2.9     01-26        RADIOLOGY & ADDITIONAL STUDIES:  CT head  No evidence of intracranial hemorrhage, territorial infarct, or mass   effect.    PROTEIN CALORIE MALNUTRITION PRESENT: [ ]mild [ ]moderate [ ]severe [ ]underweight [ ]morbid obesity  https://www.[email or web address]/vault/9860/web/files/ONC/Table_Clinical%20Characteristics%20to%20Document%20Malnutrition-White%20JV%20et%20al%369598.pdf    Height (cm): 160 (01-22-23 @ 19:17), 160 (01-11-23 @ 18:00)  Weight (kg): 54.5 (01-11-23 @ 18:00)  BMI (kg/m2): 21.3 (01-22-23 @ 19:17), 21.3 (01-11-23 @ 18:00)    [ ]PPSV2 < or = to 30% [ ]significant weight loss  [ ]poor nutritional intake  [ ]anasarca      [ ]Artificial Nutrition      REFERRALS:   [ ]Chaplaincy  [ ]Hospice  [ ]Child Life  [ ]Social Work  [ ]Case management [ ]Holistic Therapy     Goals of Care Document:     ______________  Bharath Eden MD  Palliative Medicine  [facility]   of Geriatric and Palliative Medicine  [phone]

## 2023-01-27 NOTE — CHART NOTE - NSCHARTNOTEFT_GEN_A_CORE
PALLIATIVE MEDICINE INTERDISCIPLINARY TEAM NOTE    Provider:  [   ]Social Work   [   ]          [   ] Initial visit [   ] Follow up    Family or contact name / phone #   Met with: [   ] Patient  [   ] Family  [   ] Other:    Primary Language: [   ] English [   ] Other*:                      *Interpretation provided by:    SUPPORT DIAGNOSES            (Check all that apply)  [   ] Psychosocial spiritual assessment (PSSA)  [   ] EOL issues  [   ] Cultural / spiritual concerns  [   ] Pain / suffering  [   ] Dementia / AMS  [   ] Other:  [   ] AD issues  [   ] Grief / loss / sadness  [   ] Discharge issues  [   ] Distress / coping    PSYCHOSOCIAL ASSESSMENT OF PATIENT         (Check all that apply)  [   ] Initial Assessment            [   ] Reassessment          [   ] Not Applicable this visit    Pain/suffering acuity:  [   ] None to mild (0-3)           [   ] Moderate (4-6)        [   ] High (7-10)    Mental Status:  [   ] Alert/oriented (x3)          [   ] Confused/Altered(x2/x1)         [   ] Non-resp    Functional status:  [   ] Independent w ADLs      [   ] Needs Assistance             [   ] Bedbound/Full Care    Coping:  [   ] Coping well                     [   ] Coping w/difficulty            [   ] Poor coping    Support system:  [   ] Strong                              [   ] Adequate                        [   ] Inadequate    SPIRITUAL ASSESSMENT  Worship/Spiritual practice: ____Catholic_______________________    Role of organized Spiritism:  [   ] Important                     [   ] Some (fam tradition, cultural)               [ x  ] None    Effects on medical care:  [   ] Yes, _____________________________________                         [ x  ] None    Cultural/[employer]ist need:  [   ] Yes, _____________________________________                         [ x  ] None    Refer to Pastoral Care:  [   ] Yes           [  x ] No, not at this time    SERVICE PROVIDED  [   ]PSSA                                                                      [   ]Discharge support / facilitation  [   ]AD / goals of care counseling                                  [   ]EOL / death / bereavement counseling  [   ]Counseling / support                                              [   ] Family meeting  [   ]Prayer / sacrament / ritual                                       [   ] Referral   [x ]Other                                                                       NOTE and Plan of Care (PoC): Pt. looked comfortable. she not aware of her  passing. Pt's daughter and spouse were at bedside at time of visit. They have good coping mechanism. I was a supportive presence. Pt is leaving for hospice today.

## 2023-01-27 NOTE — DISCHARGE NOTE NURSING/CASE MANAGEMENT/SOCIAL WORK - CAREGIVER ADDRESS
Discharge Instructions  Vomiting    You have been seen today for vomiting (throwing up). This is usually caused by a virus, but some bacteria, parasites, medicines or other medical conditions can cause similar symptoms. At this time your provider does not find that your vomiting is a sign of anything dangerous or life-threatening. However, sometimes the signs of serious illness do not show up right away. If you have new or worse symptoms, you may need to be seen again in the Emergency Department or by your primary provider. Remember that serious problems like appendicitis can start as vomiting.    Generally, every Emergency Department visit should have a follow-up clinic visit with either a primary or a specialty clinic/provider. Please follow-up as instructed by your emergency provider today.    Return to the Emergency Department if:    You keep vomiting and you are not able to keep liquids down.     You feel you are getting dehydrated, such as being very thirsty, not urinating (peeing) at least every 8-12 hours, or feeling faint or lightheaded.     You develop a new fever, or your fever continues for more than 2 days.     You have abdominal (belly pain) that seems worse than cramps, is in one spot, or is getting worse over time. Appendicitis usually causes pain in the right lower abdomen (to the right and below your belly button) so watch for pain in this location.    You have blood in your vomit or stools.     You feel very weak.    You are not starting to improve within 24 hours of your visit here.     What can I do to help myself?    The most important thing to do is to drink clear liquids. If you have been vomiting a lot, it is best to have only small, frequent sips of liquids. Drinking too much at once may cause more vomiting. If you are vomiting often, you must replace minerals, sodium and potassium lost with your illness. Pedialyte  is the best available rehydration liquid but some find that it doesn t  taste good so sports drinks are an alterative. You can also drink clear liquids such as water, weak tea, apple juice, and 7-Up . Avoid acid liquids (orange), caffeine (coffee) or alcohol. Do not drink milk until you no longer have diarrhea (loose stools).     After liquids are staying down, you may start eating mild foods. Soda crackers, toast, plain noodles, gelatin, applesauce and bananas are good first choices. Avoid foods that have acid, are spicy, fatty or have a lot of fiber (such as meats, coarse grains, vegetables). You may start eating these foods again in about 3 days when you are better.     Sometimes treatment includes prescription medicine to prevent nausea (sick to your stomach) and vomiting. If your provider prescribes these for you, take them as directed.     Do not take ibuprofen, naproxen, or other nonsteroidal anti-inflammatory (NSAID) medicines without checking with your healthcare provider.     If you were given a prescription for medicine here today, be sure to read all of the information (including the package insert) that comes with your prescription.  This will include important information about the medicine, its side effects, and any warnings that you need to know about.  The pharmacist who fills the prescription can provide more information and answer questions you may have about the medicine.  If you have questions or concerns that the pharmacist cannot address, please call or return to the Emergency Department.     Remember that you can always come back to the Emergency Department if you are not able to see your regular provider in the amount of time listed above, if you get any new symptoms, or if there is anything that worries you.   [address], [place] [address]

## 2023-01-27 NOTE — DISCHARGE NOTE PROVIDER - CARE PROVIDER_API CALL
Mendez Whitten  Internal Medicine  [address] [place] [address]  Phone: [phone]  Fax: ()-  Established Patient  Follow Up Time: 1-3 days

## 2023-01-28 LAB
CULTURE RESULTS: SIGNIFICANT CHANGE UP
CULTURE RESULTS: SIGNIFICANT CHANGE UP
SPECIMEN SOURCE: SIGNIFICANT CHANGE UP
SPECIMEN SOURCE: SIGNIFICANT CHANGE UP

## 2023-02-02 DIAGNOSIS — C79.51 SECONDARY MALIGNANT NEOPLASM OF BONE: ICD-10-CM

## 2023-02-02 DIAGNOSIS — N13.6 PYONEPHROSIS: ICD-10-CM

## 2023-02-02 DIAGNOSIS — R06.02 SHORTNESS OF BREATH: ICD-10-CM

## 2023-02-02 DIAGNOSIS — Z51.5 ENCOUNTER FOR PALLIATIVE CARE: ICD-10-CM

## 2023-02-02 DIAGNOSIS — E87.5 HYPERKALEMIA: ICD-10-CM

## 2023-02-02 DIAGNOSIS — I12.9 HYPERTENSIVE CHRONIC KIDNEY DISEASE WITH STAGE 1 THROUGH STAGE 4 CHRONIC KIDNEY DISEASE, OR UNSPECIFIED CHRONIC KIDNEY DISEASE: ICD-10-CM

## 2023-02-02 DIAGNOSIS — Z87.891 PERSONAL HISTORY OF NICOTINE DEPENDENCE: ICD-10-CM

## 2023-02-02 DIAGNOSIS — K72.00 ACUTE AND SUBACUTE HEPATIC FAILURE WITHOUT COMA: ICD-10-CM

## 2023-02-02 DIAGNOSIS — C23 MALIGNANT NEOPLASM OF GALLBLADDER: ICD-10-CM

## 2023-02-02 DIAGNOSIS — C78.7 SECONDARY MALIGNANT NEOPLASM OF LIVER AND INTRAHEPATIC BILE DUCT: ICD-10-CM

## 2023-02-02 DIAGNOSIS — R33.9 RETENTION OF URINE, UNSPECIFIED: ICD-10-CM

## 2023-02-02 DIAGNOSIS — I27.82 CHRONIC PULMONARY EMBOLISM: ICD-10-CM

## 2023-02-02 DIAGNOSIS — E78.5 HYPERLIPIDEMIA, UNSPECIFIED: ICD-10-CM

## 2023-02-02 DIAGNOSIS — Z66 DO NOT RESUSCITATE: ICD-10-CM

## 2023-02-02 DIAGNOSIS — I26.99 OTHER PULMONARY EMBOLISM WITHOUT ACUTE COR PULMONALE: ICD-10-CM

## 2023-02-02 DIAGNOSIS — Z88.0 ALLERGY STATUS TO PENICILLIN: ICD-10-CM

## 2023-02-02 DIAGNOSIS — Z85.3 PERSONAL HISTORY OF MALIGNANT NEOPLASM OF BREAST: ICD-10-CM

## 2023-02-02 DIAGNOSIS — R18.8 OTHER ASCITES: ICD-10-CM

## 2023-02-02 DIAGNOSIS — J96.01 ACUTE RESPIRATORY FAILURE WITH HYPOXIA: ICD-10-CM

## 2023-02-02 DIAGNOSIS — N17.9 ACUTE KIDNEY FAILURE, UNSPECIFIED: ICD-10-CM

## 2023-02-02 DIAGNOSIS — K83.1 OBSTRUCTION OF BILE DUCT: ICD-10-CM

## 2023-02-02 DIAGNOSIS — N18.30 CHRONIC KIDNEY DISEASE, STAGE 3 UNSPECIFIED: ICD-10-CM

## 2023-02-02 DIAGNOSIS — D53.9 NUTRITIONAL ANEMIA, UNSPECIFIED: ICD-10-CM

## 2023-02-02 DIAGNOSIS — Z88.6 ALLERGY STATUS TO ANALGESIC AGENT: ICD-10-CM

## 2023-02-07 RX ORDER — MECLIZINE HCL 12.5 MG
1 TABLET ORAL
Qty: 0 | Refills: 0 | DISCHARGE

## 2023-02-07 RX ORDER — LOSARTAN POTASSIUM 100 MG/1
1 TABLET, FILM COATED ORAL
Qty: 0 | Refills: 0 | DISCHARGE

## 2023-02-07 RX ORDER — ATORVASTATIN CALCIUM 80 MG/1
1 TABLET, FILM COATED ORAL
Qty: 0 | Refills: 0 | DISCHARGE

## 2023-02-07 RX ORDER — MORPHINE SULFATE 50 MG/1
0.5 CAPSULE, EXTENDED RELEASE ORAL
Qty: 0 | Refills: 0 | DISCHARGE

## 2023-02-07 RX ORDER — FUROSEMIDE 40 MG
1 TABLET ORAL
Qty: 0 | Refills: 0 | DISCHARGE

## 2023-02-07 RX ORDER — MEMANTINE HYDROCHLORIDE 10 MG/1
1 TABLET ORAL
Qty: 0 | Refills: 0 | DISCHARGE

## 2023-02-07 RX ORDER — ICOSAPENT ETHYL 500 MG/1
2 CAPSULE, LIQUID FILLED ORAL
Qty: 0 | Refills: 0 | DISCHARGE

## 2023-05-01 NOTE — H&P ADULT - NS ATTEND BILL GEN_ALL_CORE
Attending to bill Rinvoq Counseling: I discussed with the patient the risks of Rinvoq therapy including but not limited to upper respiratory tract infections, shingles, cold sores, bronchitis, nausea, cough, fever, acne, and headache. Live vaccines should be avoided.  This medication has been linked to serious infections; higher rate of mortality; malignancy and lymphoproliferative disorders; major adverse cardiovascular events; thrombosis; thrombocytopenia, anemia, and neutropenia; lipid elevations; liver enzyme elevations; and gastrointestinal perforations.
